# Patient Record
Sex: FEMALE | Race: WHITE | NOT HISPANIC OR LATINO | Employment: OTHER | URBAN - METROPOLITAN AREA
[De-identification: names, ages, dates, MRNs, and addresses within clinical notes are randomized per-mention and may not be internally consistent; named-entity substitution may affect disease eponyms.]

---

## 2023-08-11 ENCOUNTER — HOSPITAL ENCOUNTER (OUTPATIENT)
Dept: DATA CONVERSION | Facility: HOSPITAL | Age: 68
End: 2023-08-11

## 2023-09-27 ENCOUNTER — HOSPITAL ENCOUNTER (OUTPATIENT)
Dept: DATA CONVERSION | Facility: HOSPITAL | Age: 68
End: 2023-09-27
Payer: COMMERCIAL

## 2023-10-04 ENCOUNTER — CLINICAL SUPPORT (OUTPATIENT)
Dept: SPORTS MEDICINE | Facility: CLINIC | Age: 68
End: 2023-10-04
Payer: COMMERCIAL

## 2023-10-04 VITALS — BODY MASS INDEX: 31 KG/M2 | HEIGHT: 64 IN | SYSTOLIC BLOOD PRESSURE: 126 MMHG | DIASTOLIC BLOOD PRESSURE: 76 MMHG

## 2023-10-04 DIAGNOSIS — S83.412D SPRAIN OF MEDIAL COLLATERAL LIGAMENT OF LEFT KNEE, SUBSEQUENT ENCOUNTER: ICD-10-CM

## 2023-10-04 DIAGNOSIS — M25.862 PATELLOFEMORAL DYSFUNCTION OF LEFT KNEE: ICD-10-CM

## 2023-10-04 DIAGNOSIS — M21.70 LEG LENGTH DISCREPANCY: ICD-10-CM

## 2023-10-04 DIAGNOSIS — M76.52 PATELLAR TENDONITIS OF LEFT KNEE: ICD-10-CM

## 2023-10-04 DIAGNOSIS — M25.562 ACUTE PAIN OF LEFT KNEE: ICD-10-CM

## 2023-10-04 DIAGNOSIS — M71.22 POPLITEAL CYST, LEFT: ICD-10-CM

## 2023-10-04 DIAGNOSIS — L03.116 CELLULITIS OF KNEE, LEFT: ICD-10-CM

## 2023-10-04 DIAGNOSIS — M21.079 EVERSION DEFORMITY OF FOOT, UNSPECIFIED LATERALITY: ICD-10-CM

## 2023-10-04 DIAGNOSIS — M17.12 PRIMARY OSTEOARTHRITIS OF LEFT KNEE: ICD-10-CM

## 2023-10-04 DIAGNOSIS — S83.8X2D INJURY OF MENISCUS OF LEFT KNEE, SUBSEQUENT ENCOUNTER: ICD-10-CM

## 2023-10-04 DIAGNOSIS — S83.282D TEAR OF LATERAL MENISCUS OF LEFT KNEE, CURRENT, UNSPECIFIED TEAR TYPE, SUBSEQUENT ENCOUNTER: ICD-10-CM

## 2023-10-04 DIAGNOSIS — S83.242D TEAR OF MEDIAL MENISCUS OF LEFT KNEE, CURRENT, UNSPECIFIED TEAR TYPE, SUBSEQUENT ENCOUNTER: ICD-10-CM

## 2023-10-04 ASSESSMENT — PAIN SCALES - GENERAL: PAINLEVEL_OUTOF10: 3

## 2023-10-04 ASSESSMENT — PAIN DESCRIPTION - DESCRIPTORS: DESCRIPTORS: ACHING;NAGGING

## 2023-10-04 ASSESSMENT — PAIN - FUNCTIONAL ASSESSMENT: PAIN_FUNCTIONAL_ASSESSMENT: 0-10

## 2023-10-04 NOTE — PROGRESS NOTES
"Verbal consent of the patient and/or verbal parental consent for patients under the age of 18 have been obtained to conduct a physical examination at this office visit.          Established patient  History Of Present Illness  Marietta Avilez is a 68 y.o. female who presents for Prolozone #4 injection into her LEFT knee. She states that she is making slow, gradual improvement in knee pain since starting Euflexxa and Prolozone injections into her LEFT Knee. She continues to attend physical therapy and feels that aquatic therapy has been very beneficial. She continues to perform her home exercise program as directed previously. Marietta is not taking anything for pain management and is only applying moist heat as needed.     Historical Clinical Intake  February 21, 2023-------KEVYN  Verbal consent of the patient and/or verbal parental consent for patients under the age of 18 have been obtained to conduct a physical examination at this office visit. Newly established patient. Marietta, a 67 year old, visited the clinical today for her LEFT knee. Marietta brought her  to her appointment today. Marietta states she was in [place] and was walking on an uneven sidewalk and tripped and fell onto her LEFT knee in a flexed position on February 6, 2023. She states this is the first time she has been seen for this injury, therefore she did not have previous x-rays. Marietta states she has no previous medical history of any knee injuries. Marietta rated her pain as a 0/10 today stating she experiences discomfort, not necessarily pain. She was tender to palpate on the superior pole of the patella where she had an abrasion from the fall. Marietta states initially her LEFT knee was bleeding. Marietta stated when she fell it was a 10/10 pain. Marietta denies any pops, snaps or cracks as well as any numbness/tingling or pins and needles. She did note sometime last week she felt a \"pinched nerve\" while walking. She denies trying any current treatments for " "pain relief. She experienced no pain with knee flexion/extension.  -----------------------------  May 10, 2023 Above note reviewed. Verbal consent of the patient and/or verbal parental consent for patients under the age of 18 have been obtained to conduct a physical examination at this office visit. Marietta returns today for follow up of her LEFT knee. She states that she has good and bad days. Rates current pain as a 3/10 describing it as a throbbing pain. She has been referred in by her physical therapist due to a popping that was felt Friday during her physical therapy appointment. Pt also notes that she has been experiencing \"crackling\" feeling as well in her knee. We discussed treatment options. Patient has had several weeks of physical therapy and was making progress but has had a sudden relapse with significant increase in pain. This is concerning for more significant disease the previously considered as such we will order an MRI for further evaluation of her knee with consideration of meniscus versus MCL injury. Patient verbalizes understanding and agreement with plan of care.  -----------------------------  June 7, 2023 Above notes reviewed. Verbal consent of the patient and/or verbal parental consent for patients under the age of 18 have been obtained to conduct a physical examination at this office visit. Marietta returns today for follow up of her MRI of her LEFT knee. Pt states that she feels the same since her previous visit. Rates current pain as a 2-3/10 noting that her pain is always there. Pt completed physical therapy as of Friday last week. We reviewed patient MRI results in detail. Patient verbalizes understanding of results. We discussed treatment options and will fit patient for OA unloading brace with medial pressure and will have patient follow-up with Dr. Faulkner to discuss therapeutic injections. Patient will continue with physical therapy or home exercise plan and can take over-the-counter " medications for pain. Patient verbalizes understanding and agreement with plan of care.  -----------------------------  July 20, 2023 Above notes reviewed. Verbal consent of the patient and/or verbal parental consent for patients under the age of 18 have been obtained to conduct a physical examination at this office visit. Marietta, a newly established patient, returns today for Prolozone #1 injection into the LEFT knee. She is c/o numbness in her LEFT leg from the anterior aspect of her knee radiating down to her foot. Denies any locking/catching or instability in her LEFT Knee. Pain is currently 3/10. She says she still having a lot of pain in her knee at times however additionally she feels that other times it is radiating down into her foot and she describes it as a numbness and tingling. She also says going up and down steps hurts more than normal. I told her most likely she is dealing with 2 different things some arthritic changes in her knee as well as her meniscus injuries as well as additionally something going on from her lumbar spine radiating down her leg and into her foot. She is going to schedule an appointment for us to workup her lumbar spine in the future. Also she says she has never heard from the bracing representative yet for her custom OA unloading knee brace which I told her we would look into for her. We also talked in detail about different supplements she can take to help with the arthritis however she says she cannot take pills at all however I talked to her into looking into liquid forms of glucosamine chondroitin with hyaluronic acid and if not I told her she can grind up the pills and put them in applesauce.  -----------------------------  July 26,2023 Above notes reviewed. Verbal consent of the patient and/or verbal parental consent for patients under the age of 18 have been obtained to conduct a physical examination at this office visit. Marietta, a newly established patient, returns today  for Euflexxa#1. Marietta is currently 3/10 pain to Left knee described as aching primarily to the medial anterior and posterior knee. She confirms she occasionally has tingling and numbness to the LEFT leg. She is unable to swallow pills so she doesn't take anything for pain, but she does apply ice and heat to the area. She is in the process of scheduling her physical therapy.  -----------------------------  August 3, 2023 All notes reviewed. Verbal consent of the patient and/or verbal parental consent for patients under the age of 18 have been obtained to conduct a physical examination at this office visit. Marietta is a recently established patient who presents for Prolozone #2 injection into her LEFT knee. she says that she was a little sore after her last injection but overall she is doing okay. She says she noticed a difference in her range of motion and she is definitely not as painful.  -----------------------------  August 31, 2023 All notes reviewed. Verbal consent of the patient and/or verbal parental consent for patients under the age of 18 have been obtained to conduct a physical examination at this office visit. Marietta is a recently established patient who is here today for her Euflexxa #2 injection into her LEFT knee. Marietta states that her LEFT knee is doing considerably better since she started the viscosupplementation injections and regenerative injections in conjunction with physical therapy. She is now walking without a limp and denies any swelling, locking, catching, instability in her LEFT knee. She is unable to swallow pills and therefore is only using moist heat as needed for pain management. She rates LEFT knee pain currently at 2/10.  -----------------------------  [place] 7, 2023 Above notes reviewed. Verbal consent of the patient and/or verbal parental consent for patients under the age of 18 have been obtained to conduct a physical examination at this office visit. Marietta is an established  patient who presents for Prolozone #3 injection into her LEFT knee. She attends Physical Therapy at [facility] with aquatics, which she notes is most helpful. She reports diffuse swelling throughout her left knee over this past week. She notes recent weather fluctuations and cooler temperatures have caused her pain to increase to a reported 3/10 today. She is not presently utilizing any pain management interventions.  -----------------------------  September 14, 2023 Above notes reviewed. Verbal consent of the patient and/or verbal parental consent for patients under the age of 18 have been obtained to conduct a physical examination at this office visit. Marietta, an established patient, who is here today for Euflexxa #3 injection into her LEFT knee. She states that she continues to have pain that comes and goes. She continues to c/o pain climbing steps. She denies any instability, locking or catching in her left knee. Pain is currently 3/10. She states she is not ready to do the injection today because she thought it was only Prolozone today and she would like to wait to do injection until next week so that she is mentally prepared for it. Additionally, Marietta presents with an area of skin irritation on the anterior aspect of her LEFT knee that appeared 1-2 days after her last Prolozone injection. The lesion appears to be a reaction to the cold spray. There is mild increased redness around the area, but no drainage, odor, or warmth.  -----------------------------  September 27, 2023 Above notes reviewed. Verbal consent of the patient and/or verbal parental consent for patients under the age of 18 have been obtained to conduct a physical examination at this office visit. Marietta is an established patient who is present today to recieve Euflexxa #3 injections into her LEFT knee. She states that she continues to have on/off pain, today she is experiencing more pain. Rates current pain as a 3/10. She continues with physical therapy  and has two sessions left. Pt notes that the aquatic therapy has been greatly helping her she notes. Overall she is very happy with the results of the injections not only with her range of motion but also her strength. She says definitely if she continues to get improvement she would like to do these injections again in the future instead of even thinking about any surgical intervention.    Past Medical History  She has a past medical history of History of back surgery.    Surgical History  She has no past surgical history on file.     Social History  She reports that she has never smoked. She has never used smokeless tobacco. She reports that she does not drink alcohol and does not use drugs.    Family History  No family history on file.     Allergies  Patient has no known allergies.    Review of Systems  Review of Systems:  CONSTITUTIONAL:   Negative for weight change, loss of appetite, fatigue, weakness, fever, chills, night sweats, headaches .           HEENT:   Negative for cold, cough, sore throat, sinus pain, swollen lymph nodes.           OPHTHALMOLOGY:   Negative for diminished vision, blurred vision, loss of vision, double vision.           ALLERGY:   Negative for runny nose, scratchy throat, sinus congestion, rash, facial pressure, nasal congestion, post-nasal drip.           CARDIOLOGY:   Negative for chest pain, palpitations, murmurs, irregular heart beat, shortness of breath, leg edema, dyspnea on exertion, fatigue, dizziness.           RESPIRATORY:   Negative for chest pain, shortness of breath, swelling of the legs, asthma/copd, chest congestion, pain with breathing .           GASTROENTEROLOGY:   Negative for nausea, vomitting, heartburn, constipation, diarrhea, blood in stool, change in bowel habits, black stool.           HEMATOLOGY/LYMPH:   Negative for fatigue, loss of appetitie, easy bruising, easy bleeding, anemia, abnormal bleeding, slow healing.           ENDOCRINOLOGY:   Negative for  polyuria, polydipsia, polyphagia, fatigue, weight loss, weight gain, cold intolerance, heat intolerance, diabetes.           MUSCULOSKELETAL:   Positive  for LEFT knee pain        DERMATOLOGY:   Negative for rash, bruising.           NEUROLOGY:   Negative for tingling, numbness, gait abnormality, paresthesias, weakness, sciatica.        General (KNEE):  Location:  Left .   Erythema: Negative:, :no erythema scaling and noted where injection done two weeks ago, much improved since last week.   Edema:  Negative.   Effusion: Negative.   Warmth: Negative.   Percussion Test: Negative.   Tuning Fork Test: Negative.   Ecchymosis/Bruising: Negative.   Abrasions: Negative;.   Palpation: Positive:, Tender to palpation over the patella, Anterior Portion, Medial Joint Line, MCL  Burgos's Cyst: Negative.   Orientation: Symmetrical.   Range of Motion: Positive:, Painful, Flexion : , Extension:  Improved tremendously overall with injections And physical therapy.   Muscle Strength:   Positive:,   +5/+5, Quadricep Extension  +5/+5, Hamstring Flexion     +5/+5 Hip Flexion  +5/+5 Hip Extension     +5/+5 Hip ABduction away from body  +5/+5 Hip ADduction towards body     +5/+5 Hip Internal Rotation  +5/+5 Hip External Rotation      +5/+5 IT-Band      +5/+5 Gastrocnemius  +5/+5 Soleus.     DTR/Neurological:   +2/+4 Patellar (L4)  +2/+4 Posterior Tibialis Reflex and Medial Hamstrings (L5)  +2/+4 Achilles (S1).     Sensation/Neurological Lumbar:    Negative, Sensation intact:  L1: Low back, hips, and groin  L2: Low back and front of inside of upper leg/thigh  L3: Low back and front of upper leg/thigh  L4: Low back, front of upper leg/thigh, front of lower leg/calf, front of medial area of knee, and inside of ankle  L5: Low back, front and lateral knee, front and outside of lower leg/calf, top and bottom of foot and first four toes especially big toe.     Sensation/Neurological Sacrum:   Negative, Sensation Intact, 2 -Point Discrimination  Test: Negative  S1: low back, back of upper leg/thigh, back and inside of lower leg, calf and little toe  S2: Buttocks, genitals, back of upper leg/thigh and calves  S3: Buttocks and genitals  S4: Buttocks  S5: Buttocks.   Pain with Squat: Positive   Pain with Sumo Squat: Positive:   Vascular:   +2/+4 Femoral, +2/+4 Dorsalis Pedis, +2/+4 Posterior Tibial  Capillary Refill less than 2 seconds.   Feet/Foot:  Valgus Foot Deformity (Pes Planus) Bilateral.     Knee - ACL:  Anterior Drawer Test: Negative.   Lachman Test: Negative.   Prone Lachman Test: Negative.   Lelli Test: Negative.   Pivot Shift Test: Negative.   Crossover Test: Negative.     KNEE - FAT PAD:  Squeeze Test Knee Bent going into extension: Positive    KNEE - IT BAND:  Julio Cesar Test: Negative.   Noble Test: Negative.     KNEE - MCL / LCL:  Valgus Stress Test:  Positive:, 20-30 degrees  Varus Stress Test:  90 degrees: Negative, 20-30 degrees: Negative.    Apley Distraction Test:  Negative.   Thessaly Test:  Positive    KNEE - MENISCUS:  Apley Compression Test:  Negative.   Duck Walk Test:  Positive    Stienmann 1 Test:  Positive    Stienmann 2 Test:  Negative.   Everardo Test:  Positive  Bragard's Test:  Negative.   Bounce Home Test:  Positive  Payr Test:  Negative.   Thessaly Test:  Positive  Kyaw's Test:  Negative.     KNEE - PATELLA:  Apprehension Test: Negative.   Glide Test: Positive:.   Facet Tenderness Test: Negative.   Medial Patellar Plica Test: Negative.   Grind Test: Negative.   Patella Tracking Test: Negative.   Medial/Lateral Subluxation Test: Negative.   Suppression Test: Negative.     Knee - PCL/POSTERIOR LATERAL CORNER:  Posterior Drawer Test: Negative.   Hammer 90/90 (Sag Sign) Test: Negative.   Dial Test: Negative.   Reverse Lachman Test: Negative.   Quad Activation Test: Negative.   ER / Recurvatum Test: Negative.   Reverse Pivot Shift Test: Negative.     KNEE - POPLITEUS:  Jonathan's Test: Negative.     KNEE - QUADRICEPS:  Dreyer Test:  "Negative.   VMO Test: Negative.    VLO Test: Negative.     Hip/Pelvis - Sacrum:  Leg Length Supine:  Negative.   Leg Length Supine to Seated (Derbolowsky Sign):  Negative.   Standing Flexion Test:  Negative.   Seated Flexion Test:  Negative.   Spring Test:  Negative.   Sacral Somatic Dysfunction:  Negative.   Sacroiliac (SI) Joint Fixation Test:  Negative.   Hip Flexor Tightness:  Negative.   Hamstring Tightness:  Negative.      Last Recorded Vitals  /76 (BP Location: Right arm, Patient Position: Sitting, BP Cuff Size: Adult)   Ht 1.626 m (5' 4\")   BMI 31.00 kg/m²      Imaging and Diagnostics Review:  PROCEDURE: KNEE LT WO - TMR 2204  REASON FOR EXAM: SPRAIN OF OTHER SPECIFIED PARTS OF LEFT KNEE, SUBS ENCNTR   RESULT: MRN: [id number]     Patient Name: SHAKILA ESQUEDA  STUDY:KNEE LT WO 5/31/2023 6:02 pm  INDICATION:SPRAIN OF OTHER SPECIFIED PARTS OF LEFT KNEE, SUBS ENCNTR pain fall anterior knee  COMPARISON:02/21/2023     ACCESSION NUMBER(S):[id number]   ORDERING CLINICIAN:YELENA MENENDEZ     STUDY:Multiplanar and multisequence MR images were obtained of the left knee.      FINDINGS:  Anterior cruciate ligament is intact.        Posterior cruciate ligament is intact.        Lateral meniscus demonstrates a free edge tear of the posterior horn of the lateral meniscus about the body junction (series 7, image 5 and series 9, image 17).         Medial meniscus demonstrates a blunted free edge of the posterior horn of the medial meniscus component which is likely on a degenerative basis.          Correlate with patient's history including partial meniscectomy. The body of the medial meniscus is unremarkable with mild meniscal degeneration.          No longitudinal tear       Medial collateral ligament complex is unremarkable         Lateral supporting structures are intact. There is no edema in the posterolateral corner         Extensor mechanism is intact. Mild patellar tendinosis demonstrated.         "   Patellofemoral articulation demonstrates mild thinning of the articular cartilage particularly within the lateral facet of the patella inferiorly. Trochlea demonstrates mild thinning of the cartilage along the outer margin medial trochlea. No cartilage defect      Lateral femorotibial compartment demonstrates mild thinning of the cartilage along the outer margin weight-bearing portion of the compartment. Medial femorotibial compartment demonstrates denuded articular cartilage within the weight-bearing portion of the compartment.           There is osteophytosis along the outer margin of the compartment. Minimal subchondral intermediate signal intensity. Also, component of severe cartilage thinning along the posterior nonweightbearing femoral condyle           There is no knee joint effusion. No intra-articular bodies demonstrated.            Subtle popliteal cyst demonstrated.       IMPRESSION:  1. Medial femorotibial compartment osteoarthritis with severe loss of articular cartilage within the weight-bearing portion of the compartment.  2. Free edge tear of the posterior horn lateral meniscus about the body junction.  3. Blunted free edge free edge irregularity of the posterior horn of the medial meniscus on a degenerative basis. Mild peripheral extrusion of the body medial meniscus.            Dictation workstation: [id number]   Original Interpreting Physician: ANGELA NUGENT MD  Original Transcribed by/Date: MMODAL May 31 2023 4:55P  -----------------------------------------------------  PROCEDURE: KNEE LT MIN 4 VIEW - TXR 0182  REASON FOR EXAM: ACUTE PAIN OF LEFT KNEE  RESULT: MRN: [id number]     Patient Name: SHAKILA ESQUEDA  STUDY:KNEE LT MIN 4 VIEW; 2/21/2023 8:50 am  INDICATION:ACUTE PAIN OF LEFT KNEE. Status post fall 2 weeks ago. Anterior pain and swelling.  COMPARISON:None.    ACCESSION NUMBER(S):[id number]  ORDERING CLINICIAN:YELENA MENENDEZ    TECHNIQUE:Left knee four views    FINDINGS:  No fractures  or destructive lesions are identified. There is medial tibiofemoral marginal spurring. No effusion is identified.    IMPRESSION:  Medial tibiofemoral osteoarthritis.    Dictation workstation: [id number]  Original Interpreting Physician: BINDU TORRES M.D.  Original Transcribed by/Date: MMODAL Feb 21 2023 8:35A    Assessment   1. Acute pain of left knee        2. Injury of meniscus of left knee, subsequent encounter        3. Tear of lateral meniscus of left knee, current, unspecified tear type, subsequent encounter        4. Tear of medial meniscus of left knee, current, unspecified tear type, subsequent encounter        5. Patellar tendonitis of left knee        6. Sprain of medial collateral ligament of left knee, subsequent encounter        7. Primary osteoarthritis of left knee        8. Popliteal cyst, left        9. Patellofemoral dysfunction of left knee        10. Eversion deformity of foot, unspecified laterality        11. Leg length discrepancy        12. Cellulitis of knee, left            Procedure   Time Out (5 Minutes): Yes  Patient Name: Marietta Avilez  YOB: 1955  Procedure: Prolozone injection #4  Needed Supplies Available: Correct Supplies  Laterality: Left knee  Site Verified and Marked: Correct Site(s) Marked  Timeout Performed by Provider: Dr. Richard Faulkner D.O.  Staff Initials: SLN    Patient is informed and consent has been signed by the patient if over 18 years old., Patient parent and/or legal guardian is informed and consent has been signed., Patient and/or parent and/or legal guardian accept all risks, benefits, and possible complications associated with procedure(s) and/or manipulation(s) and/or injection(s)., Patient and/or parent and/or legal guardian deny patient allergy or known complications with any of the medications, instruments, products, and/or techniques used., All questions and concerns were answered and/or addressed with the patient and/or parent and/or  legal guardian., The patient and/or parent and/or legal guardian agree to proceed with the procedure(s) and/or manipulation(s) and/or injection(s)., Prep: The area was cleansed with a mixture of equal parts rubbing alcohol 70% and Hibclens., Utilizing clean technique, the injection site(s) were marked with a skin marker., and Injection site(s) were anesthestized with topical analgesic spray prior to injection.    Performed as a separate, cash-based service regenerative Prolozone injection # 4 into the patients Left knee, under ultrasound.    Prolozone mixture of:, 2mL Lidocaine 2% (NDC: 3191-0390-34 LOT#: [id number] EXP: 01/01/2025), 0.1mL Sodium Bicarbonate 8.4% (NDC: 8182-7112-10  LOT#: [id number] EXP: 01/01/2024), 1mL Vitamin B12 (NDC: 74664-206-50; LOT#: [id number] EXP: 08/31/2024) , 0.1mL Folic Acid (NDC: 95144-966-95; LOT#: [id number] EXP: 06/30/2024), 0.1mL Vitamin B Complex (NDC: 35969-617-61; LOT#: [id number] EXP: 12/01/2024), 1mL 50% Dextrose (NDC: [address]-4; LOT#: [id number] EXP: 12/01/2024), 2mL Biocean Marine Plasma (NO NDC) , and  followed by injection of Ozone 20mg/ml without removing the needle    Band-aid and/or compressive bandage was placed over the injection site(s). After the injection(s) performed osteopathic manipulation therapy to the affected area(s) to help increase blood flow to aid with the healing process as well as circulation of the medication. The patient tolerated the procedure well without any complications. The patient was instructed to gently massage the treatment site(s) as well as to apply moist heat to the affected area(s). Additionally, the patient was instructed to contact the office immediately with any complications or concerns.    The Scribe, Lalita, and , Mely, were present in the room during the entire procedure.    The following discharge instructions were reviewed in detail with the patient to the level of their understanding:    PAIN:  A mild to moderate amount of  discomfort, tenderness, stiffness, and achiness-caused by the inflammation of the area can be expected for a few days after the injection. This is normal and good as the inflammation is an important part of the healing process.    SKIN: Due to the numbing spray used, you may develop a red, itchy, scaly rash in the area that was sprayed. Should your skin become itchy, wash the area with mild soap and warm water. If needed, you may apply topical over-the-counter Hydrocortisone cream to alleviate any itchiness. AVOID scratching due to risk of infection.,     BATHING/SWIMMING: You may shower after your procedure with regular soap and water, but no baths, no swimming, and no hot tubs for 3-4 days after the procedure.,     ACTIVITIES:  Immediately following the injection, you may resume daily activities (such as work) and light exercise. We suggest that you refrain from strenuous activity and heavy lifting, particularly the injured body part, for a week post-procedure, but sometimes this can change as well.    MOIST HEAT/ICE:  Moist heat may be used on the injection area. NO ICE.  MEDICATION: You may continue your prescribed medications and any over-the-counter supplements; however, it is not recommended to use aspirin or anti-inflammatories (Motrin, Advil, Aleve, Ibuprofen, Naproxen etc.) for up to 2 weeks post procedure. Tylenol Extra Strength, Tylenol Arthritis and/or any prescribed narcotics or muscle relaxants are OK to take.    APPOINTMENTS: You should have a follow-up appointment with Dr. Faulkner scheduled 1-3 weeks as recommended after your procedure for your next round of injections or to follow-up after you have completed your injection series. Please schedule your follow-up appointment on your way out after your procedure, or call the office to schedule these appointments as soon as possible.    PRECAUTIONS: Smoking, caffeine, alcohol, sex and anti-inflammatories post-procedure may reduce the effectiveness of  Prolotherapy/Neural Prolotherapy/Prolozone injections. Early, rare post procedure problems that can be concerning are as follows: an increase in pain not relieved by medication (over the counter or prescription), a temperature above 101 degrees, bleeding or progressive, extreme swelling, or numbness.     CALL THE OFFICE OR GO TO THE EMERGENCY ROOM IF ANY OF THESE SYMPTOMS OCCUR.   If you have any questions or problems, please call our office at [phone]    Treatment or Intervention:  1. May continue moist heat as needed only while doing regenerative injections.  2. Continue to perform home exercise program as directed previously by her physical therapist. Plan to write updated order in a few weeks to return to PT including aquatic therapy  3. Again stressed the importance of wearing shoes with good stability control to help with the biomechanics affecting the knees and lower extremities  4. Again stressed the importance of wearing full foot insoles to help with the biomechanics affecting the knees and lower extremities  5. Again recommendation over-the-counter vitamin-D 2 -3000+ milligrams a day, as well as a daily multivitamin. Advised patient to try and find liquid form for supplements or crush them up and put them in applesauce.  6. Again recommendation over-the-counter Move Free for joint health.  7. May take the following: OTC Tylenol Extra Strength or OTC Tylenol Arthritis, taking one every 6-8 hours with food as needed. Again, may crush up pills and put them in applesauce.  8. Patient advised regarding the risks and/or potential adverse reactions and/or side effects of any prescribed medications along with any over-the-counter medications or any supplements used. Patient advised to seek immediate medical care if any adverse reactions occur. The patient and/or patient(s) parent(s) verbalized their understanding  9. continue to wear OA unloading knee brace.  10. Performed as a separate cash-based service  Prolozone #4 injection into the patients BILATERAL knees under ultrasound. The following medications were injected intra-articularly by Dr. LNARE Faulkner D.O. into both of the patients knees:  11. Follow-up in 3 months for a reevaluation of the patients LEFT knee or sooner as needed.       Diagnostic studies:  No additional imaging or laboratory studies are needed at this time.    Activity Instructions, Restrictions, and Accommodations:  No activity limitations or modifications are needed at this time.    Consultations/Referrals:  None at this time.    Follow-up:  Follow-up in 3 months for a reevaluation of the patients LEFT knee or sooner as needed.       RIKA WHITFIELD on 10/10/23 at 1:16 PM.     Richard Faulkner DO, FAOASM

## 2023-10-06 NOTE — PATIENT INSTRUCTIONS
The following discharge instructions were reviewed in detail with the patient to the level of their understanding:    PAIN:  A mild to moderate amount of discomfort, tenderness, stiffness, and achiness-caused by the inflammation of the area can be expected for a few days after the injection. This is normal and good as the inflammation is an important part of the healing process.    SKIN: Due to the numbing spray used, you may develop a red, itchy, scaly rash in the area that was sprayed. Should your skin become itchy, wash the area with mild soap and warm water. If needed, you may apply topical over-the-counter Hydrocortisone cream to alleviate any itchiness. AVOID scratching due to risk of infection.,     BATHING/SWIMMING: You may shower after your procedure with regular soap and water, but no baths, no swimming, and no hot tubs for 3-4 days after the procedure.,     ACTIVITIES:  Immediately following the injection, you may resume daily activities (such as work) and light exercise. We suggest that you refrain from strenuous activity and heavy lifting, particularly the injured body part, for a week post-procedure, but sometimes this can change as well.    MOIST HEAT/ICE:  Moist heat may be used on the injection area. NO ICE.  MEDICATION: You may continue your prescribed medications and any over-the-counter supplements; however, it is not recommended to use aspirin or anti-inflammatories (Motrin, Advil, Aleve, Ibuprofen, Naproxen etc.) for up to 2 weeks post procedure. Tylenol Extra Strength, Tylenol Arthritis and/or any prescribed narcotics or muscle relaxants are OK to take.    APPOINTMENTS: You should have a follow-up appointment with Dr. Faulkner scheduled 1-3 weeks as recommended after your procedure for your next round of injections or to follow-up after you have completed your injection series. Please schedule your follow-up appointment on your way out after your procedure, or call the office to schedule these  appointments as soon as possible.    PRECAUTIONS: Smoking, caffeine, alcohol, sex and anti-inflammatories post-procedure may reduce the effectiveness of Prolotherapy/Neural Prolotherapy/Prolozone injections. Early, rare post procedure problems that can be concerning are as follows: an increase in pain not relieved by medication (over the counter or prescription), a temperature above 101 degrees, bleeding or progressive, extreme swelling, or numbness.     CALL THE OFFICE OR GO TO THE EMERGENCY ROOM IF ANY OF THESE SYMPTOMS OCCUR.   If you have any questions or problems, please call our office at [phone]    Treatment or Intervention:  1. May continue moist heat as needed only while doing regenerative injections.  2. Continue to perform home exercise program as directed previously by her physical therapist. Plan to write updated order in a few weeks to return to PT including aquatic therapy  3. Again stressed the importance of wearing shoes with good stability control to help with the biomechanics affecting the knees and lower extremities  4. Again stressed the importance of wearing full foot insoles to help with the biomechanics affecting the knees and lower extremities  5. Again recommendation over-the-counter vitamin-D 2 -3000+ milligrams a day, as well as a daily multivitamin. Advised patient to try and find liquid form for supplements or crush them up and put them in applesauce.  6. Again recommendation over-the-counter Move Free for joint health.  7. May take the following: OTC Tylenol Extra Strength or OTC Tylenol Arthritis, taking one every 6-8 hours with food as needed. Again, may crush up pills and put them in applesauce.  8. Patient advised regarding the risks and/or potential adverse reactions and/or side effects of any prescribed medications along with any over-the-counter medications or any supplements used. Patient advised to seek immediate medical care if any adverse reactions occur. The patient  and/or patient(s) parent(s) verbalized their understanding  9. continue to wear OA unloading knee brace.    Follow-up in 3 months for a reevaluation of the patients LEFT knee or sooner as needed.

## 2023-10-19 PROBLEM — S83.412A SPRAIN OF MEDIAL COLLATERAL LIGAMENT OF LEFT KNEE: Status: ACTIVE | Noted: 2023-10-19

## 2023-10-19 PROBLEM — S89.92XA INJURY OF LEFT KNEE: Status: ACTIVE | Noted: 2023-10-19

## 2023-10-19 PROBLEM — S83.282A TEAR OF LATERAL MENISCUS OF LEFT KNEE, CURRENT: Status: ACTIVE | Noted: 2023-10-19

## 2023-10-19 PROBLEM — M71.22 POPLITEAL CYST, LEFT: Status: ACTIVE | Noted: 2023-10-19

## 2023-10-19 PROBLEM — S80.00XA CONTUSION OF KNEE: Status: ACTIVE | Noted: 2023-10-19

## 2023-10-19 PROBLEM — S83.242A TEAR OF MEDIAL MENISCUS OF LEFT KNEE, CURRENT: Status: ACTIVE | Noted: 2023-10-19

## 2023-10-19 PROBLEM — M76.52 PATELLAR TENDONITIS OF LEFT KNEE: Status: ACTIVE | Noted: 2023-10-19

## 2023-10-19 PROBLEM — M17.12 PRIMARY OSTEOARTHRITIS OF LEFT KNEE: Status: ACTIVE | Noted: 2023-10-19

## 2023-10-19 PROBLEM — S76.312A LEFT HAMSTRING MUSCLE STRAIN: Status: ACTIVE | Noted: 2023-10-19

## 2023-10-19 PROBLEM — M21.70 LEG LENGTH DISCREPANCY: Status: ACTIVE | Noted: 2023-10-19

## 2023-10-19 PROBLEM — M25.562 ACUTE PAIN OF LEFT KNEE: Status: ACTIVE | Noted: 2023-10-19

## 2023-10-20 ENCOUNTER — TREATMENT (OUTPATIENT)
Dept: PHYSICAL THERAPY | Facility: CLINIC | Age: 68
End: 2023-10-20
Payer: COMMERCIAL

## 2023-10-20 DIAGNOSIS — S83.412D SPRAIN OF MEDIAL COLLATERAL LIGAMENT OF LEFT KNEE, SUBSEQUENT ENCOUNTER: ICD-10-CM

## 2023-10-20 DIAGNOSIS — S83.412A SPRAIN OF MEDIAL COLLATERAL LIGAMENT OF LEFT KNEE, INITIAL ENCOUNTER: ICD-10-CM

## 2023-10-20 DIAGNOSIS — M76.52 PATELLAR TENDINITIS, LEFT KNEE: ICD-10-CM

## 2023-10-20 DIAGNOSIS — M25.562 PAIN IN LEFT KNEE: ICD-10-CM

## 2023-10-20 PROCEDURE — 97113 AQUATIC THERAPY/EXERCISES: CPT | Mod: GP

## 2023-10-20 ASSESSMENT — PAIN - FUNCTIONAL ASSESSMENT: PAIN_FUNCTIONAL_ASSESSMENT: 0-10

## 2023-10-20 ASSESSMENT — PAIN SCALES - GENERAL: PAINLEVEL_OUTOF10: 3

## 2023-10-20 NOTE — PROGRESS NOTES
Physical Therapy Treatment    Patient Name: Marietta Avilez  MRN: [id number]  Encounter date:  10/20/2023  Time Calculation  Start Time: 1315  Stop Time: 1400  Time Calculation (min): 45 min    Visit Number:  9/10   Visit Authorized:  40/yr (18 used prior to this series)    Current Problem  1. Sprain of medial collateral ligament of left knee, initial encounter  PT eval and treat      2. Sprain of medial collateral ligament of left knee, subsequent encounter  PT eval and treat      3. Patellar tendinitis, left knee  PT eval and treat      4. Pain in left knee  PT eval and treat          Precautions  Precautions  Precautions Comment: none    Pain  Pain Assessment: 0-10  Pain Score: 3  Pain Location: Knee  Pain Orientation: Right    Subjective  General  Response to Previous Treatment: Patient with no complaints from previous session., Compliant with home exercise program  Pt not to PT in 3 weeks due to scheduling and other conflicts. She was doing pretty well until two days ago, she was sitting for a long time and had increased pain in her knee and her hip. Her hip is better today, but knee pain remains a little higher than previous visits.    Objective  Pain with L knee flexion in WB and NWB      Treatment:    Aquatic Therapy:   Aquatic Exercise Performed: Yes  Walk x3 laps fwd, bwd, s/s across pool    4' depth, cues for TrA brace, x15 ea with railing support  Heel raise  Hip abduction  Hip extension  Hip flexion/SLR  Hamstring curl    March in place x10 without rail support    Deep, 5' with noodle under arms, BUE support:  Decompression x2'  Hip abd/adduction x2'  XC ski x2' - deferred due to pain  Bike x2'  Decompression x2'    Assessment:  PT Assessment  Evaluation/Treatment Tolerance: Patient limited by pain  Pt's response to treatment:  More support used in shallow water due to higher pain levels. Pt still had some pain in pool with L knee flexion however better than on land. Pt encouraged to continue with HEP and  use ice/heat as needed to manage symptoms over the weekend.   Areas of improvements:  Some reduction in pain in pool  Limitations/deficits:  limited tolerance to knee flexion exercises    Pain end of session:  2/10    Plan:  Reassess next visit    Assessment of current progress against goals:  Progressing toward functional goals      Goals:  Active       PT Problem       Goals (Progressing)       Start:  09/28/23    Expected End:  12/27/23       Pt will demonstrate 0-125 deg L knee ROM without pain for improved body mechanics during functional mobility.     Pt will increase strength in LLE by 1/2 MMT in all planes for improved performance of functional mobility.    Pt will ambulate long community distances across all surfaces including grass, inclines, and declines without pain or deviation.    Pt will ascend/descend 2 flights of stairs reciprocally without pain for improved performance of household and community mobility.    Pt will stand for up to one hour without pain and without feelings of imbalance.    Pt will complete 5x sit to  12s without pain for improved functional strength and mobility.

## 2023-10-27 ENCOUNTER — TREATMENT (OUTPATIENT)
Dept: PHYSICAL THERAPY | Facility: CLINIC | Age: 68
End: 2023-10-27
Payer: COMMERCIAL

## 2023-10-27 DIAGNOSIS — M25.562 PAIN IN LEFT KNEE: ICD-10-CM

## 2023-10-27 DIAGNOSIS — M76.52 PATELLAR TENDINITIS, LEFT KNEE: ICD-10-CM

## 2023-10-27 DIAGNOSIS — S83.412A SPRAIN OF MEDIAL COLLATERAL LIGAMENT OF LEFT KNEE, INITIAL ENCOUNTER: ICD-10-CM

## 2023-10-27 DIAGNOSIS — S83.412D SPRAIN OF MEDIAL COLLATERAL LIGAMENT OF LEFT KNEE, SUBSEQUENT ENCOUNTER: ICD-10-CM

## 2023-10-27 PROCEDURE — 97113 AQUATIC THERAPY/EXERCISES: CPT | Mod: GP

## 2023-10-27 ASSESSMENT — PAIN - FUNCTIONAL ASSESSMENT: PAIN_FUNCTIONAL_ASSESSMENT: 0-10

## 2023-10-27 ASSESSMENT — PAIN SCALES - GENERAL: PAINLEVEL_OUTOF10: 3

## 2023-10-27 NOTE — PROGRESS NOTES
Physical Therapy Treatment    Patient Name: Marietta Avilez  MRN: [id number]  Encounter date:  10/27/2023  Time Calculation  Start Time: 1200  Stop Time: 1241  Time Calculation (min): 41 min    Date of evaluation: 8/11/23  Visit #: 10/10  Visits authorized: 40/yr (18 used prior to this series)    Current Problem  1. Sprain of medial collateral ligament of left knee, initial encounter  PT eval and treat    Follow Up In Physical Therapy      2. Sprain of medial collateral ligament of left knee, subsequent encounter  PT eval and treat    Follow Up In Physical Therapy      3. Patellar tendinitis, left knee  PT eval and treat    Follow Up In Physical Therapy      4. Pain in left knee  PT eval and treat    Follow Up In Physical Therapy            Precautions  Precautions  Precautions Comment: none    Pain  Pain Assessment: 0-10  Pain Score: 3  Pain Location: Knee  Pain Orientation: Left    Subjective  General  Response to Previous Treatment: Patient with no complaints from previous session., Compliant with home exercise program  Pt reports feeling less pain overall since starting in the pool, and symptoms at their worst are less intense than they had been before. The last week has been more painful without specific reason which makes her ability to  her progress more challenging. She thinks she is stronger overall but due to this week's pain some weakness is present. Walking tolerance has improved, but stairs are challenging. She would like to continue PT after this date.     Objective  LE MMT L   Hip flexion 4+/5   Hip extension 4/5   Hip abduction 4/5   Hip adduction 4/5   Knee flexion 4/5   Knee extension 4+/5     Knee AROM L   Flexion 115 deg   Extension 0 deg     Gait: Antalgia, slow nba on land with decreased LLE stance time (improved since eval)  5x sit to stand: 24.5s, painful (bench lower than at eval - higher pain today likely limits effectiveness of testing)  Stairs: performs reciprocally but pain when  ascending with LLE    Treatment:    Aquatic Therapy:   Aquatic Exercise Performed: Yes  Walk x3 laps fwd, bwd, s/s across pool    4' depth, cues for TrA brace, x15 ea with railing support  Heel raise  Hip abduction  Hip extension  Hip flexion/SLR  Hamstring curl    March in place x10 without rail support - deferred due to pain    Deep, 5' with noodle under arms, BUE support:  Decompression x2'  Hip abd/adduction x2'  XC ski x2'   Bike x2'  Decompression x2'    Assessment:  PT Assessment  PT Assessment Results: Decreased strength, Decreased range of motion, Impaired balance, Decreased mobility, Pain  POC treatments have consisted of: therapeutic exercise and aquatics    Testing somewhat limited today due to higher pain, pt would likely show better strength and functional mobility prior to recent flare up.  Pt response to treatment: Pt with overall better strength, pain levels, and mobility since starting aquatics. She would benefit from continued intervention to address remaining impairments and improve function.  Remaining deficits: decreased ROM, weakness, pain with distance walking and stairs     Pain end of session:  2/10    Plan:  OP PT Plan  Treatment/Interventions: Aquatic therapy  PT Plan: Skilled PT  PT Frequency: 2 times per week  Duration: 10 additional visits (20 total)  Certification Period Start Date: 10/27/23  Certification Period End Date: 01/25/24  Number of Treatments Authorized: 40/yr  Rehab Potential: Good  Plan of Care Agreement: Patient      Goals:  Pt progressing towards goals, testing today somewhat limited by pain flare up. Goals remain appropriate.   Active       PT Problem       Goals (Progressing)       Start:  09/28/23    Expected End:  01/25/24       Pt will demonstrate 0-125 deg L knee ROM without pain for improved body mechanics during functional mobility.     Pt will increase strength in LLE by 1/2 MMT in all planes for improved performance of functional mobility.    Pt will ambulate  long community distances across all surfaces including grass, inclines, and declines without pain or deviation.    Pt will ascend/descend 2 flights of stairs reciprocally without pain for improved performance of household and community mobility.    Pt will stand for up to one hour without pain and without feelings of imbalance.    Pt will complete 5x sit to  12s without pain for improved functional strength and mobility.

## 2023-10-30 ENCOUNTER — OFFICE VISIT (OUTPATIENT)
Dept: PRIMARY CARE | Facility: CLINIC | Age: 68
End: 2023-10-30
Payer: COMMERCIAL

## 2023-10-30 VITALS
HEIGHT: 64 IN | BODY MASS INDEX: 30.49 KG/M2 | OXYGEN SATURATION: 99 % | DIASTOLIC BLOOD PRESSURE: 70 MMHG | WEIGHT: 178.6 LBS | SYSTOLIC BLOOD PRESSURE: 132 MMHG | HEART RATE: 68 BPM

## 2023-10-30 DIAGNOSIS — M54.16 LUMBAR RADICULOPATHY: Primary | ICD-10-CM

## 2023-10-30 PROCEDURE — 1036F TOBACCO NON-USER: CPT | Performed by: PHYSICIAN ASSISTANT

## 2023-10-30 PROCEDURE — 1125F AMNT PAIN NOTED PAIN PRSNT: CPT | Performed by: PHYSICIAN ASSISTANT

## 2023-10-30 PROCEDURE — 99213 OFFICE O/P EST LOW 20 MIN: CPT | Performed by: PHYSICIAN ASSISTANT

## 2023-10-30 PROCEDURE — 1159F MED LIST DOCD IN RCRD: CPT | Performed by: PHYSICIAN ASSISTANT

## 2023-10-30 ASSESSMENT — PATIENT HEALTH QUESTIONNAIRE - PHQ9
SUM OF ALL RESPONSES TO PHQ9 QUESTIONS 1 AND 2: 0
1. LITTLE INTEREST OR PLEASURE IN DOING THINGS: NOT AT ALL
2. FEELING DOWN, DEPRESSED OR HOPELESS: NOT AT ALL

## 2023-10-30 ASSESSMENT — PAIN SCALES - GENERAL: PAINLEVEL: 4

## 2023-10-30 NOTE — PROGRESS NOTES
"Subjective   Patient ID: Marietta Avilez is a 68 y.o. female who presents for Follow-up (Patient states she has been having b/l leg numbness for the past few months.).    Presents for follow up - states that she has had progressive numbness and weakness in legs for several months. States that she has worsening sensation in R leg. Denies recent injury but remote hx of back surgery.   Currently completing water therapy as well for ongoing knee/leg pain.         Review of Systems   All other systems reviewed and are negative.      Objective   /70   Pulse 68   Ht 1.626 m (5' 4\")   Wt 81 kg (178 lb 9.6 oz)   SpO2 99%   BMI 30.66 kg/m²     Physical Exam  Constitutional:       General: She is not in acute distress.  Cardiovascular:      Rate and Rhythm: Normal rate and regular rhythm.   Pulmonary:      Breath sounds: Normal breath sounds.   Musculoskeletal:      Comments: R SI tenderness; reflexes equal;    Neurological:      Mental Status: She is alert.         Assessment/Plan   Diagnoses and all orders for this visit:  Lumbar radiculopathy  -     MR lumbar spine wo IV contrast; Future         "

## 2023-11-14 ENCOUNTER — TREATMENT (OUTPATIENT)
Dept: PHYSICAL THERAPY | Facility: CLINIC | Age: 68
End: 2023-11-14
Payer: COMMERCIAL

## 2023-11-14 DIAGNOSIS — M25.562 PAIN IN LEFT KNEE: ICD-10-CM

## 2023-11-14 DIAGNOSIS — S83.412A SPRAIN OF MEDIAL COLLATERAL LIGAMENT OF LEFT KNEE, INITIAL ENCOUNTER: ICD-10-CM

## 2023-11-14 DIAGNOSIS — M76.52 PATELLAR TENDINITIS, LEFT KNEE: ICD-10-CM

## 2023-11-14 DIAGNOSIS — S83.412D SPRAIN OF MEDIAL COLLATERAL LIGAMENT OF LEFT KNEE, SUBSEQUENT ENCOUNTER: ICD-10-CM

## 2023-11-14 PROCEDURE — 97113 AQUATIC THERAPY/EXERCISES: CPT | Mod: GP

## 2023-11-14 ASSESSMENT — PAIN - FUNCTIONAL ASSESSMENT: PAIN_FUNCTIONAL_ASSESSMENT: 0-10

## 2023-11-14 ASSESSMENT — PAIN SCALES - GENERAL: PAINLEVEL_OUTOF10: 2

## 2023-11-14 NOTE — PROGRESS NOTES
Physical Therapy Treatment    Patient Name: Marietta Avilez  MRN: [id number]  Encounter date:  11/14/2023  Time Calculation  Start Time: 1031  Stop Time: 1115  Time Calculation (min): 44 min    Visit Number:  11/20   Visit Authorized:  40/yr (18 used prior to this series)    Current Problem  1. Sprain of medial collateral ligament of left knee, initial encounter  Follow Up In Physical Therapy      2. Sprain of medial collateral ligament of left knee, subsequent encounter  Follow Up In Physical Therapy      3. Patellar tendinitis, left knee  Follow Up In Physical Therapy      4. Pain in left knee  Follow Up In Physical Therapy            Precautions  Precautions  Precautions Comment: none    Pain  Pain Assessment: 0-10  Pain Score: 2  Pain Location: Knee  Pain Orientation: Left  Pain was up to 4/10 yesterday    Subjective  General  Response to Previous Treatment: Patient with no complaints from previous session., Compliant with home exercise program  Pt continues to have intermittent flare ups without cause. Yesterday pain was high without incident or increase in activity. She is frustrated by increase in symptoms whenever she starts to feel better.     Objective  Gait WNL in pool, mild antalgia on deck    Treatment:  Aquatic Therapy:   Aquatic Exercise Performed: Yes  Walk x3 laps fwd, bwd, s/s across pool    4' depth, cues for TrA brace, x15 ea with railing support  Heel raise  Hip abduction  Hip extension  Hip flexion/SLR  Hamstring curl    March in place x10 without rail support    Deep, 5' with noodle under arms, BUE support:  Decompression x2'  Hip abd/adduction x2'  XC ski x2' - deferred due to pain  Bike x2'  Decompression x2'    Assessment:  PT Assessment  PT Assessment Results: Decreased strength, Decreased range of motion, Impaired balance, Decreased mobility, Pain  Pt's response to treatment:  Pt encouraged to schedule follow up with Dr. Faulkner due to increase in symptoms recently. Despite recent pain pt had  better exercise tolerance then previous visits. Pt with good reduction in pain from treatment.   Areas of improvements:  good pain relief in pool  Limitations/deficits:  continued flare ups outside of therapy    Pain end of session:  0/10    Plan:  Continue with current POC/no changes    Assessment of current progress against goals:  Progressing toward functional goals      Goals:  Active       PT Problem       Goals (Progressing)       Start:  09/28/23    Expected End:  01/25/24       Pt will demonstrate 0-125 deg L knee ROM without pain for improved body mechanics during functional mobility.     Pt will increase strength in LLE by 1/2 MMT in all planes for improved performance of functional mobility.    Pt will ambulate long community distances across all surfaces including grass, inclines, and declines without pain or deviation.    Pt will ascend/descend 2 flights of stairs reciprocally without pain for improved performance of household and community mobility.    Pt will stand for up to one hour without pain and without feelings of imbalance.    Pt will complete 5x sit to  12s without pain for improved functional strength and mobility.

## 2023-11-16 ENCOUNTER — APPOINTMENT (OUTPATIENT)
Dept: RADIOLOGY | Facility: CLINIC | Age: 68
End: 2023-11-16
Payer: COMMERCIAL

## 2023-11-17 ENCOUNTER — TREATMENT (OUTPATIENT)
Dept: PHYSICAL THERAPY | Facility: CLINIC | Age: 68
End: 2023-11-17
Payer: COMMERCIAL

## 2023-11-17 DIAGNOSIS — M25.562 PAIN IN LEFT KNEE: ICD-10-CM

## 2023-11-17 DIAGNOSIS — S83.412A SPRAIN OF MEDIAL COLLATERAL LIGAMENT OF LEFT KNEE, INITIAL ENCOUNTER: ICD-10-CM

## 2023-11-17 DIAGNOSIS — M76.52 PATELLAR TENDINITIS, LEFT KNEE: ICD-10-CM

## 2023-11-17 DIAGNOSIS — S83.412D SPRAIN OF MEDIAL COLLATERAL LIGAMENT OF LEFT KNEE, SUBSEQUENT ENCOUNTER: ICD-10-CM

## 2023-11-17 PROCEDURE — 97113 AQUATIC THERAPY/EXERCISES: CPT | Mod: GP

## 2023-11-17 ASSESSMENT — PAIN SCALES - GENERAL: PAINLEVEL_OUTOF10: 2

## 2023-11-17 ASSESSMENT — PAIN - FUNCTIONAL ASSESSMENT: PAIN_FUNCTIONAL_ASSESSMENT: 0-10

## 2023-11-17 NOTE — PROGRESS NOTES
Physical Therapy Treatment    Patient Name: Marietta Avilez  MRN: [id number]  Encounter date:  11/17/2023  Time Calculation  Start Time: 1300  Stop Time: 1345  Time Calculation (min): 45 min    Visit Number:  12/20   Visit Authorized:  40/yr (18 used prior to this series)    Current Problem  1. Sprain of medial collateral ligament of left knee, initial encounter  Follow Up In Physical Therapy      2. Sprain of medial collateral ligament of left knee, subsequent encounter  Follow Up In Physical Therapy      3. Patellar tendinitis, left knee  Follow Up In Physical Therapy      4. Pain in left knee  Follow Up In Physical Therapy              Precautions  Precautions  Precautions Comment: none    Pain  Pain Assessment: 0-10  Pain Score: 2  Pain Location: Knee  Pain Orientation: Left      Subjective  General  Response to Previous Treatment: Patient with no complaints from previous session., Compliant with home exercise program  Pain has been better overall this week, much lower than the past few weeks. She did have some sharper pain this morning, but it has been less frequent than last week.    Objective  Increased discomfort trying single UE support but did not linger    Treatment:  Aquatic Therapy:   Aquatic Exercise Performed: Yes  Walk x3 laps fwd, bwd, s/s across pool    4' depth, cues for TrA brace, x12 ea with single UE on railing  Heel raise  Hip abduction  Hip extension  Hip flexion/SLR  Hamstring curl    March across pool     Deep, 5' with noodle under arms, BUE support:  Decompression x2'  Hip abd/adduction x2'  XC ski x2'   Bike x2'  Decompression x2'    Assessment:  PT Assessment  PT Assessment Results: Decreased strength, Decreased range of motion, Impaired balance, Decreased mobility, Pain  Pt's response to treatment:  shallow exercise progressed with reduced UE support. Pt with some discomfort initially but this improved within session.   Areas of improvements:  lower pain levels this  week  Limitations/deficits:  pain on stairs and with flexion continues    Pain end of session:  0/10    Plan:  Continue with current POC/no changes    Assessment of current progress against goals:  Progressing toward functional goals      Goals:  Active       PT Problem       Goals (Progressing)       Start:  09/28/23    Expected End:  01/25/24       Pt will demonstrate 0-125 deg L knee ROM without pain for improved body mechanics during functional mobility.     Pt will increase strength in LLE by 1/2 MMT in all planes for improved performance of functional mobility.    Pt will ambulate long community distances across all surfaces including grass, inclines, and declines without pain or deviation.    Pt will ascend/descend 2 flights of stairs reciprocally without pain for improved performance of household and community mobility.    Pt will stand for up to one hour without pain and without feelings of imbalance.    Pt will complete 5x sit to  12s without pain for improved functional strength and mobility.

## 2023-11-20 ENCOUNTER — TELEPHONE (OUTPATIENT)
Dept: PRIMARY CARE | Facility: CLINIC | Age: 68
End: 2023-11-20
Payer: COMMERCIAL

## 2023-11-20 NOTE — TELEPHONE ENCOUNTER
Pt called stating her PA for her MRI of her back was denied - she states she received a letter in which [facility] can appeal with [employer] - Case# [id number] -- they can be reached at [phone]

## 2023-11-21 ENCOUNTER — TREATMENT (OUTPATIENT)
Dept: PHYSICAL THERAPY | Facility: CLINIC | Age: 68
End: 2023-11-21
Payer: COMMERCIAL

## 2023-11-21 DIAGNOSIS — S83.412D SPRAIN OF MEDIAL COLLATERAL LIGAMENT OF LEFT KNEE, SUBSEQUENT ENCOUNTER: ICD-10-CM

## 2023-11-21 DIAGNOSIS — S83.412A SPRAIN OF MEDIAL COLLATERAL LIGAMENT OF LEFT KNEE, INITIAL ENCOUNTER: ICD-10-CM

## 2023-11-21 DIAGNOSIS — M76.52 PATELLAR TENDINITIS, LEFT KNEE: ICD-10-CM

## 2023-11-21 DIAGNOSIS — M25.562 PAIN IN LEFT KNEE: ICD-10-CM

## 2023-11-21 PROCEDURE — 97113 AQUATIC THERAPY/EXERCISES: CPT | Mod: GP

## 2023-11-21 ASSESSMENT — PAIN - FUNCTIONAL ASSESSMENT: PAIN_FUNCTIONAL_ASSESSMENT: 0-10

## 2023-11-21 ASSESSMENT — PAIN SCALES - GENERAL: PAINLEVEL_OUTOF10: 2

## 2023-11-21 NOTE — PROGRESS NOTES
Physical Therapy Treatment    Patient Name: Marietta Avilez  MRN: [id number]  Encounter date:  11/21/2023  Time Calculation  Start Time: 1255  Stop Time: 1340  Time Calculation (min): 45 min    Visit Number:  13/20   Visit Authorized:  40/yr (18 used prior to this series)    Current Problem  1. Sprain of medial collateral ligament of left knee, initial encounter  Follow Up In Physical Therapy      2. Sprain of medial collateral ligament of left knee, subsequent encounter  Follow Up In Physical Therapy      3. Patellar tendinitis, left knee  Follow Up In Physical Therapy      4. Pain in left knee  Follow Up In Physical Therapy            Precautions  Precautions  Precautions Comment: none    Pain  Pain Assessment: 0-10  Pain Score: 2  Pain Location: Knee  Pain Orientation: Left      Subjective  General  Response to Previous Treatment: Patient with no complaints from previous session., Compliant with home exercise program  Pain is still present distal to knee joint line, but instances of sharp pain have decreased over the past week. She was scheduled to get an MRI of her spine to determine if that could be a contributor, but this was denied by insurance.     Objective  Mild hip strategy with marching    Treatment:  Aquatic Therapy:   Aquatic Exercise Performed: Yes  Walk x3 laps fwd, bwd, s/s across pool    4' depth, cues for TrA brace, x12 ea with single UE on railing  Heel raise  Hip abduction  Hip extension  Hip flexion/SLR  Hamstring curl    March across pool x3 laps with noodle support    Deep, 5' with noodle under arms, BUE support:  Decompression x2'  Hip abd/adduction x2'  XC ski x2'   Bike x2'  Decompression x2'    Assessment:  PT Assessment  PT Assessment Results: Decreased strength, Decreased range of motion, Impaired balance, Decreased mobility, Pain  Pt's response to treatment:  Attempted marching without support however pt with some imbalance, this improved with use of noodle for support. Pt with better  gait pattern in pool and on pool deck without significant deviation.  Areas of improvements:  Fewer instances of sharp pain  Limitations/deficits: discomfort persists on stairs    Pain end of session:  0/10    Plan:  Continue with current POC/no changes    Assessment of current progress against goals:  Progressing toward functional goals      Goals:  Active       PT Problem       Goals (Progressing)       Start:  09/28/23    Expected End:  01/25/24       Pt will demonstrate 0-125 deg L knee ROM without pain for improved body mechanics during functional mobility.     Pt will increase strength in LLE by 1/2 MMT in all planes for improved performance of functional mobility.    Pt will ambulate long community distances across all surfaces including grass, inclines, and declines without pain or deviation.    Pt will ascend/descend 2 flights of stairs reciprocally without pain for improved performance of household and community mobility.    Pt will stand for up to one hour without pain and without feelings of imbalance.    Pt will complete 5x sit to  12s without pain for improved functional strength and mobility.

## 2023-11-28 ENCOUNTER — APPOINTMENT (OUTPATIENT)
Dept: PHYSICAL THERAPY | Facility: CLINIC | Age: 68
End: 2023-11-28
Payer: COMMERCIAL

## 2023-11-28 NOTE — PROGRESS NOTES
Physical Therapy Treatment    Patient Name: Marietta Avilez  MRN: [id number]  Encounter date:  11/28/2023       Visit Number:  14/20   Visit Authorized:  40/yr (18 used prior to this series)    Current Problem  No diagnosis found.        Precautions       Pain         Subjective  General     ***    Objective  ***    Treatment:  Aquatic Therapy:      Walk x3 laps fwd, bwd, s/s across pool    4' depth, cues for TrA brace, x12 ea with single UE on railing  Heel raise  Hip abduction  Hip extension  Hip flexion/SLR  Hamstring curl    March across pool x3 laps with noodle support    Deep, 5' with noodle under arms, BUE support:  Decompression x2'  Hip abd/adduction x2'  XC ski x2'   Bike x2'  Decompression x2'    Assessment:     Pt's response to treatment:  ***.  Areas of improvements:  ***  Limitations/deficits: ***    Pain end of session:  0/10    Plan:  Continue with current POC/no changes    Assessment of current progress against goals:  Progressing toward functional goals      Goals:

## 2023-11-29 PROBLEM — M25.862 PATELLOFEMORAL DYSFUNCTION OF LEFT KNEE: Status: ACTIVE | Noted: 2023-11-29

## 2023-11-29 PROBLEM — M21.079 EVERSION DEFORMITY OF FOOT: Status: ACTIVE | Noted: 2023-11-29

## 2023-11-29 PROBLEM — S83.8X2A INJURY OF MENISCUS OF LEFT KNEE: Status: ACTIVE | Noted: 2023-11-29

## 2023-11-29 PROBLEM — L03.116 CELLULITIS OF KNEE, LEFT: Status: ACTIVE | Noted: 2023-11-29

## 2023-11-29 NOTE — PROGRESS NOTES
"Verbal consent of the patient and/or verbal parental consent for patients under the age of 18 have been obtained to conduct a physical examination at this office visit.    Established patient  History Of Present Illness  12/06/23 Marietta Avilez is a 68 y.o. female who presents for re-evaluation of her LEFT knee and evaluation of her Lumbar Spine.  She is 3 months s/p regenerative injections and Euflexxa injections into her LEFT knee. Marietta did get mild relief of LEFT Knee pain s/p injections; however, she states that her LEFT knee pain is getting worse recently around her LEFT patella as well as along the medial joint line. She has some swelling in her LEFT knee, but denies any instability in the LEFT knee. She states that she has \"nerve\" pain along the anterior aspect of her LEFT Knee.  She has been working in physical therapy nonstop since in August not only for her knees but also her lumbar spine and is not getting any better.  Physical therapy has sent her back in for further evaluation and workup because they feel they cannot do anything else for her lumbar spine or needs to really figure out what else is going on so they want her further evaluated and worked up with further testing preferably MRI of the lumbar spine. Additionally, She does feel like her balance is off due to her knees and feels like it is more due to her back pain radiating down into her legs. She has c/o bilateral radiculopathy LEFT > RIGHT that has not improved despite physical therapy and OTC Liquid NSAIDS and Tylenol has not helped. She is unable to take any oral medication as she states she can't swallow pills and therefore, can not take higher doses of medication or the supplements that were recommended previously.    Marietta is currently still in aquatic therapy for her Left knee and low back and continues to perform her home exercise program. She has been attending physical therapy including aquatic therapy continually since August 2023 " "with no improvement and is currently c/o shooting pain down her LEFT leg as well as numbness and tingling.  Her pain is 2/10 currently, but is exacerbated with prolonged walking, standing, stair climbing, bending and squatting. She often feels off balance/unsteady due to her low back pain.      Last Recorded Vitals  /86 (BP Location: Right arm, Patient Position: Sitting, BP Cuff Size: Adult)   Ht 1.626 m (5' 4\")   Wt 80.7 kg (178 lb)   BMI 30.55 kg/m²      All previous Progress Notes and imaging results related to this patients chief complaint have been reviewed in preparation for this examination.    Past Medical History  She has a past medical history of History of back surgery.    Surgical History  She has no past surgical history on file.     Social History  She reports that she has never smoked. She has never used smokeless tobacco. She reports that she does not drink alcohol and does not use drugs.    Family History  No family history on file.     Allergies  Patient has no known allergies.    Historical Clinical Intake  February 21, 2023-------ROMULO  Verbal consent of the patient and/or verbal parental consent for patients under the age of 18 have been obtained to conduct a physical examination at this office visit. Newly established patient. Marietta, a 67 year old, visited the clinical today for her LEFT knee. Marietta brought her  to her appointment today. Marietta states she was in [place] and was walking on an uneven sidewalk and tripped and fell onto her LEFT knee in a flexed position on February 6, 2023. She states this is the first time she has been seen for this injury, therefore she did not have previous x-rays. Marietta states she has no previous medical history of any knee injuries. Marietta rated her pain as a 0/10 today stating she experiences discomfort, not necessarily pain. She was tender to palpate on the superior pole of the patella where she had an abrasion from the fall. Marietta states " "initially her LEFT knee was bleeding. Marietta stated when she fell it was a 10/10 pain. Marietta denies any pops, snaps or cracks as well as any numbness/tingling or pins and needles. She did note sometime last week she felt a \"pinched nerve\" while walking. She denies trying any current treatments for pain relief. She experienced no pain with knee flexion/extension.  -----------------------------  May 10, 2023 Above note reviewed. Verbal consent of the patient and/or verbal parental consent for patients under the age of 18 have been obtained to conduct a physical examination at this office visit. Marietta returns today for follow up of her LEFT knee. She states that she has good and bad days. Rates current pain as a 3/10 describing it as a throbbing pain. She has been referred in by her physical therapist due to a popping that was felt Friday during her physical therapy appointment. Pt also notes that she has been experiencing \"crackling\" feeling as well in her knee. We discussed treatment options. Patient has had several weeks of physical therapy and was making progress but has had a sudden relapse with significant increase in pain. This is concerning for more significant disease the previously considered as such we will order an MRI for further evaluation of her knee with consideration of meniscus versus MCL injury. Patient verbalizes understanding and agreement with plan of care.  -----------------------------  June 7, 2023 Above notes reviewed. Verbal consent of the patient and/or verbal parental consent for patients under the age of 18 have been obtained to conduct a physical examination at this office visit. Marietta returns today for follow up of her MRI of her LEFT knee. Pt states that she feels the same since her previous visit. Rates current pain as a 2-3/10 noting that her pain is always there. Pt completed physical therapy as of Friday last week. We reviewed patient MRI results in detail. Patient verbalizes " understanding of results. We discussed treatment options and will fit patient for OA unloading brace with medial pressure and will have patient follow-up with Dr. Faulkner to discuss therapeutic injections. Patient will continue with physical therapy or home exercise plan and can take over-the-counter medications for pain. Patient verbalizes understanding and agreement with plan of care.  -----------------------------  July 20, 2023 Above notes reviewed. Verbal consent of the patient and/or verbal parental consent for patients under the age of 18 have been obtained to conduct a physical examination at this office visit. Marietta, a newly established patient, returns today for Prolozone #1 injection into the LEFT knee. She is c/o numbness in her LEFT leg from the anterior aspect of her knee radiating down to her foot. Denies any locking/catching or instability in her LEFT Knee. Pain is currently 3/10. She says she still having a lot of pain in her knee at times however additionally she feels that other times it is radiating down into her foot and she describes it as a numbness and tingling. She also says going up and down steps hurts more than normal. I told her most likely she is dealing with 2 different things some arthritic changes in her knee as well as her meniscus injuries as well as additionally something going on from her lumbar spine radiating down her leg and into her foot. She is going to schedule an appointment for us to workup her lumbar spine in the future. Also she says she has never heard from the bracing representative yet for her custom OA unloading knee brace which I told her we would look into for her. We also talked in detail about different supplements she can take to help with the arthritis however she says she cannot take pills at all however I talked to her into looking into liquid forms of glucosamine chondroitin with hyaluronic acid and if not I told her she can grind up the pills and put them  in applesauce.  -----------------------------  July 26,2023 Above notes reviewed. Verbal consent of the patient and/or verbal parental consent for patients under the age of 18 have been obtained to conduct a physical examination at this office visit. Marietta, a newly established patient, returns today for Euflexxa#1. Marietta is currently 3/10 pain to Left knee described as aching primarily to the medial anterior and posterior knee. She confirms she occasionally has tingling and numbness to the LEFT leg. She is unable to swallow pills so she doesn't take anything for pain, but she does apply ice and heat to the area. She is in the process of scheduling her physical therapy.  -----------------------------  August 3, 2023 All notes reviewed. Verbal consent of the patient and/or verbal parental consent for patients under the age of 18 have been obtained to conduct a physical examination at this office visit. Marietta is a recently established patient who presents for Prolozone #2 injection into her LEFT knee. she says that she was a little sore after her last injection but overall she is doing okay. She says she noticed a difference in her range of motion and she is definitely not as painful.  -----------------------------  August 31, 2023 All notes reviewed. Verbal consent of the patient and/or verbal parental consent for patients under the age of 18 have been obtained to conduct a physical examination at this office visit. Marietta is a recently established patient who is here today for her Euflexxa #2 injection into her LEFT knee. Marietta states that her LEFT knee is doing considerably better since she started the viscosupplementation injections and regenerative injections in conjunction with physical therapy. She is now walking without a limp and denies any swelling, locking, catching, instability in her LEFT knee. She is unable to swallow pills and therefore is only using moist heat as needed for pain management. She rates  LEFT knee pain currently at 2/10.  -----------------------------  Di 7, 2023 Above notes reviewed. Verbal consent of the patient and/or verbal parental consent for patients under the age of 18 have been obtained to conduct a physical examination at this office visit. Marietta is an established patient who presents for Prolozone #3 injection into her LEFT knee. She attends Physical Therapy at [facility] [place] with aquatics, which she notes is most helpful. She reports diffuse swelling throughout her left knee over this past week. She notes recent weather fluctuations and cooler temperatures have caused her pain to increase to a reported 3/10 today. She is not presently utilizing any pain management interventions.  -----------------------------  September 14, 2023 Above notes reviewed. Verbal consent of the patient and/or verbal parental consent for patients under the age of 18 have been obtained to conduct a physical examination at this office visit. Marietta, an established patient, who is here today for Euflexxa #3 injection into her LEFT knee. She states that she continues to have pain that comes and goes. She continues to c/o pain climbing steps. She denies any instability, locking or catching in her left knee. Pain is currently 3/10. She states she is not ready to do the injection today because she thought it was only Prolozone today and she would like to wait to do injection until next week so that she is mentally prepared for it. Additionally, Marietta presents with an area of skin irritation on the anterior aspect of her LEFT knee that appeared 1-2 days after her last Prolozone injection. The lesion appears to be a reaction to the cold spray. There is mild increased redness around the area, but no drainage, odor, or warmth.  -----------------------------  September 27, 2023 Above notes reviewed. Verbal consent of the patient and/or verbal parental consent for patients under the age of 18 have been obtained to conduct a  physical examination at this office visit. Marietta is an established patient who is present today to recieve Euflexxa #3 injections into her LEFT knee. She states that she continues to have on/off pain, today she is experiencing more pain. Rates current pain as a 3/10. She continues with physical therapy and has two sessions left. Pt notes that the aquatic therapy has been greatly helping her she notes. Overall she is very happy with the results of the injections not only with her range of motion but also her strength. She says definitely if she continues to get improvement she would like to do these injections again in the future instead of even thinking about any surgical intervention.    Review of Systems:  CONSTITUTIONAL:   Negative for weight change, loss of appetite, fatigue, weakness, fever, chills, night sweats, headaches .           HEENT:   Negative for cold, cough, sore throat, sinus pain, swollen lymph nodes.           OPHTHALMOLOGY:   Negative for diminished vision, blurred vision, loss of vision, double vision.           ALLERGY:   Negative for runny nose, scratchy throat, sinus congestion, rash, facial pressure, nasal congestion, post-nasal drip.           CARDIOLOGY:   Negative for chest pain, palpitations, murmurs, irregular heart beat, shortness of breath, leg edema, dyspnea on exertion, fatigue, dizziness.           RESPIRATORY:   Negative for chest pain, shortness of breath, swelling of the legs, asthma/copd, chest congestion, pain with breathing .           GASTROENTEROLOGY:   Negative for nausea, vomitting, heartburn, constipation, diarrhea, blood in stool, change in bowel habits, black stool.           HEMATOLOGY/LYMPH:   Negative for fatigue, loss of appetitie, easy bruising, easy bleeding, anemia, abnormal bleeding, slow healing.           ENDOCRINOLOGY:   Negative for polyuria, polydipsia, polyphagia, fatigue, weight loss, weight gain, cold intolerance, heat intolerance, diabetes.            MUSCULOSKELETAL:   Positive  for Left  knee pain and Lumbar Spine pain        DERMATOLOGY:   Negative for rash, bruising.           NEUROLOGY:   Negative for  gait abnormality, paresthesias, weakness, sciatica.      Positive for Bilateral lower extremity tingling, numbness, and radiculopathy LEFT > RIGHT      General (KNEE): Relatively unchanged since physical therapy and injections  Location:  Left>RIGHT .   Erythema: Negative:,  Edema:  Positive:  Effusion: Negative.   Warmth: Negative.   Percussion Test: Negative.   Tuning Fork Test: Negative.   Ecchymosis/Bruising: Negative.   Abrasions: Negative;.   Palpation: Positive:, Tender to palpation over the patella, Media and Lateral Joint Line, MCL  Burgos's Cyst: Negative.   Orientation: Symmetrical.   Range of Motion:  Positive:Painful, Flexion : , Extension:    Strength:  Positive:   +4+5, Quadricep Extension  +5/+5, Hamstring Flexion     +4+5 Hip Flexion  +5/+5 Hip Extension     +3+5 Hip ABduction away from body  +4+5 Hip ADduction towards body     +5/+5 Hip Internal Rotation  +5/+5 Hip External Rotation      +5/+5 IT-Band      +5/+5 Gastrocnemius  +5/+5 Soleus.      DTR/Neurological:   +2/+4 Patellar (L4)  +2/+4 Posterior Tibialis Reflex and Medial Hamstrings (L5)  +2/+4 Achilles (S1).      Sensation/Neurological Lumbar:  Relatively unchanged since physical therapy and injections   Positive: Sensation decreased  L1: Low back, hips, and groin  L2: Low back and front of inside of upper leg/thigh  L3: Low back and front of upper leg/thigh  L4: Low back, front of upper leg/thigh, front of lower leg/calf, front of medial area of knee, and inside of ankle Decreased LEFT   L5: Low back, front and lateral knee, front and outside of lower leg/calf, top and bottom of foot and first four toes especially big toe.  Decreased LEFT         Sensation/Neurological Sacrum:  Relatively unchanged since physical therapy and injections  Positive: Sensation decreased   Positive: S1:  low back, back of upper leg/thigh, back and inside of lower leg, calf and little toe Decreased LEFT   S2: Buttocks, genitals, back of upper leg/thigh and calves  S3: Buttocks and genitals  S4: Buttocks  S5: Buttocks.   Pain with Squat: Positive   Pain with Sumo Squat: Positive:   Vascular:   +2/+4 Femoral, +2/+4 Dorsalis Pedis, +2/+4 Posterior Tibial  Capillary Refill less than 2 seconds.   Feet/Foot:  Valgus Foot Deformity (Pes Planus) Bilateral.      Knee - ACL:  Anterior Drawer Test: Negative.   Lachman Test: Negative.   Prone Lachman Test: Negative.   Lelli Test: Negative.   Pivot Shift Test: Negative.   Crossover Test: Negative.      KNEE - FAT PAD: Relatively unchanged since physical therapy and injections  Squeeze Test Knee Bent going into extension: Positive     KNEE - IT BAND:  Julio Cesar Test: Negative.   Noble Test: Negative.      KNEE - MCL / LCL: Relatively unchanged since physical therapy and injections  Valgus Stress Test:  Positive:, 20-30 degrees  Varus Stress Test:  90 degrees: Negative, 20-30 degrees: Negative.    Apley Distraction Test:  Negative.   Thessaly Test:  Positive    KNEE - MENISCUS:Relatively unchanged since physical therapy and injections  Apley Compression Test:  Negative.   Duck Walk Test:  Positive    Stienmann 1 Test:  Positive    Stienmann 2 Test:  Negative.   Everardo Test:  Positive  Bragard's Test:  Negative.   Bounce Home Test:  Positive  Payr Test:  Negative.   Thessaly Test:  Positive  Kyaw's Test:  Negative.      KNEE - PATELLA:Relatively unchanged since physical therapy and injections  Apprehension Test: Negative.   Glide Test: Positive:.   Facet Tenderness Test: Negative.   Medial Patellar Plica Test: Negative.   Grind Test: Negative.   Patella Tracking Test: Negative.   Medial/Lateral Subluxation Test: Negative.   Suppression Test: Negative.      Knee - PCL/POSTERIOR LATERAL CORNER:  Posterior Drawer Test: Negative.   Hammer 90/90 (Sag Sign) Test: Negative.   Dial Test:  Negative.   Reverse Lachman Test: Negative.   Quad Activation Test: Negative.   ER / Recurvatum Test: Negative.   Reverse Pivot Shift Test: Negative.      KNEE - POPLITEUS:  Jonathan's Test: Negative.      KNEE - QUADRICEPS:  Dreyer Test: Negative.   VMO Test: Negative.    VLO Test: Negative.      Hip/Pelvis - Sacrum:  Leg Length Supine:  Negative.   Leg Length Supine to Seated (Derbolowsky Sign):  Negative.   Standing Flexion Test:  Negative.   Seated Flexion Test:  Negative.   Spring Test:  Negative.   Sacral Somatic Dysfunction:  Negative.   Sacroiliac (SI) Joint Fixation Test:  Negative.   Hip Flexor Tightness:  Negative.   Hamstring Tightness:  Negative.      Imaging and Diagnostics Review:  PROCEDURE: KNEE LT WO - TMR 2204  REASON FOR EXAM: SPRAIN OF OTHER SPECIFIED PARTS OF LEFT KNEE, SUBS ENCNTR   RESULT: MRN: [id number]     Patient Name: SHAKILA ESQUEDA  STUDY:KNEE LT WO 5/31/2023 6:02 pm  INDICATION:SPRAIN OF OTHER SPECIFIED PARTS OF LEFT KNEE, SUBS ENCNTR pain fall anterior knee  COMPARISON:02/21/2023     ACCESSION NUMBER(S):[id number]   ORDERING CLINICIAN:EYLENA MENENDEZ     STUDY:Multiplanar and multisequence MR images were obtained of the left knee.      FINDINGS:  Anterior cruciate ligament is intact.        Posterior cruciate ligament is intact.        Lateral meniscus demonstrates a free edge tear of the posterior horn of the lateral meniscus about the body junction (series 7, image 5 and series 9, image 17).         Medial meniscus demonstrates a blunted free edge of the posterior horn of the medial meniscus component which is likely on a degenerative basis.          Correlate with patient's history including partial meniscectomy. The body of the medial meniscus is unremarkable with mild meniscal degeneration.          No longitudinal tear       Medial collateral ligament complex is unremarkable         Lateral supporting structures are intact. There is no edema in the posterolateral corner          Extensor mechanism is intact. Mild patellar tendinosis demonstrated.           Patellofemoral articulation demonstrates mild thinning of the articular cartilage particularly within the lateral facet of the patella inferiorly. Trochlea demonstrates mild thinning of the cartilage along the outer margin medial trochlea. No cartilage defect      Lateral femorotibial compartment demonstrates mild thinning of the cartilage along the outer margin weight-bearing portion of the compartment. Medial femorotibial compartment demonstrates denuded articular cartilage within the weight-bearing portion of the compartment.           There is osteophytosis along the outer margin of the compartment. Minimal subchondral intermediate signal intensity. Also, component of severe cartilage thinning along the posterior nonweightbearing femoral condyle           There is no knee joint effusion. No intra-articular bodies demonstrated.            Subtle popliteal cyst demonstrated.       IMPRESSION:  1. Medial femorotibial compartment osteoarthritis with severe loss of articular cartilage within the weight-bearing portion of the compartment.  2. Free edge tear of the posterior horn lateral meniscus about the body junction.  3. Blunted free edge free edge irregularity of the posterior horn of the medial meniscus on a degenerative basis. Mild peripheral extrusion of the body medial meniscus.            Dictation workstation: [id number]   Original Interpreting Physician: ANGELA NUGENT MD  Original Transcribed by/Date: MMODAL May 31 2023 4:55P  -----------------------------------------------------  PROCEDURE: KNEE LT MIN 4 VIEW - TXR 0182  REASON FOR EXAM: ACUTE PAIN OF LEFT KNEE  RESULT: MRN: [id number]     Patient Name: SHAKILA ESQUEDA  STUDY:KNEE LT MIN 4 VIEW; 2/21/2023 8:50 am  INDICATION:ACUTE PAIN OF LEFT KNEE. Status post fall 2 weeks ago. Anterior pain and swelling.  COMPARISON:None.     ACCESSION NUMBER(S):[id number]  ORDERING  CLINICIAN:YELENA MENENDEZ     TECHNIQUE:Left knee four views     FINDINGS:  No fractures or destructive lesions are identified. There is medial tibiofemoral marginal spurring. No effusion is identified.     IMPRESSION:  Medial tibiofemoral osteoarthritis.     Dictation workstation: [id number]  Original Interpreting Physician: BINDU TORRES M.D.  Original Transcribed by/Date: MMODAL Feb 21 2023 8:35A    Examination: Relatively unchanged since physical therapy and injections   Lumbar Spine  LEFT with Radiculitis     Edema: Negative.   TART Findings: Positive  Tissue Texture Changes,Assymmetry,Restriction,Tenderness paraspinal muscles lower lumbar spine.   Ecchymosis/Bruising: Negative.   Percussion Test (LUMBAR): Negative.   Tuning Fork Test (LUMBAR): Negative.   Percussion (Sacrum): Negative.   Tuning Fork (Sacrum): Negative.     Orientation: Relatively unchanged since physical therapy and injections  Orientation (LUMBAR): Positive  Decreased Lumbar Lordosis due to muscle spasms.   Orientation (Sacrum):  Positive  Decreased Sacral Flexion/Extension.     ROM (LUMBAR):   Positive  Decreased due to pain, Forward Flexion, Extension, Lateral Bending (Side Bending), and Twisting (Rotation).     Muscle Strength:   5+/5 Hamstring Flexion  +4/+5 Quadricep Extension  +4+5 Hip Flexion  +5/+5 Hip Extension  +3/+5 Hip ABduction toward body  +4+5 Hip ADduction away from body               +5/+5 Hip Internal Rotation at 90 Degrees  +5/+5 Hip External Rotation at 90 Degrees  +5/+5 Hip Internal Rotation at 0 Degrees  +5/+5 Hip External Rotation at 0 Degrees.            DTR/Neurological:    +2/+4 Patellar Reflex (L-4)  +2/+4 Posterior Tibialis and Medial Hamstrings Reflex (L5)  +2/+4 Achilles Reflex (S-1).     Sensation/Neurological Lumbar:   Relatively unchanged since physical therapy and injections  Positive  Decreased LEFT     Negative L1: Low back, hips, and groin  Negative L2: Low back and front of inside of upper  leg/thigh  Negative L3: Low back and front of upper leg/thigh  Positive L4: Low back, front of upper leg/thigh, front of lower leg/calf, front of medial area of knee, and inside of ankle  Decreased LEFT     Positive L5: Low back, front and lateral knee, front and outside of lower leg/calf, top and bottom of foot and first four toes especially big toe. Decreased LEFT              Sensation/Neurological Sacrum:   Relatively unchanged since physical therapy and injections  Positive   Decreased LEFT   Positive S1: low back, back of upper leg/thigh, back and inside of lower leg, calf and little toe  Decreased LEFT   Negative S2: Buttocks, genitals, back of upper leg/thigh and calves  Negative S3: Buttocks and genitals  Negative S4: Buttocks  Negative S5: Buttocks.            Sensation/Neurological Coccygeal:    Negative  Sensation Intact, 2-Point Discrimination: Negative   Negative Coccyx: Buttocks and area of tailbone.            Palpation:  Negative Tenderness to Palpation.            Vascular:   Capillary Refill < 2 seconds  +2/+4Carotid  +2/+4 Dorsalis Pedis  +2/+4 Posterior Tibial.             Low Back-Disc Injury: Relatively unchanged since physical therapy and injections  Valsalva Maneuver: Negative.   Lhermitte's Sign: Negative.   Fermoral Nerve Traction Test: Negative.   Slump Test: Positive: Left > Right   Cross Test Seated: Negative.   Laseague Sign:  Positive: Left > Right    [place] Differential Test: Negative.   [place] Drop Test:  Positive: Left > Right   Seated [place] Test:  Positive: Left > Right   Reverse [place]ague Test: Negative.   Bonnet Piriformis Test: Negative.   Bragard Test: Negative.   Duchenne Trendelenberg Test: Negative.   Kernig-Brudzinski Test: Negative.   Tip Toe Heel Walking Test: Negative.   Mirna Prone Knee Flexion Test: Negative.            Low Back-Hip:  Corby/MIGUEL Test: Negative.   FADIR Test: Negative.   Iliolumbar Ligament Test: Negative.   Sacrospinous and SI Ligament  Test: Negative.   Sacrotuberal Ligament Test: Negative.   Psoas Sign: Negative.         Low Back-Sciatica:  Bundy Test: Negative.         Low Back-SI Joint:  Three-Phase Hyperextension Test: Negative.   Spine Test: Negative.   Yeoman Test: Negative.   Chaitanya Test: Negative.   Sacroilliac Stress Test: Negative.   Abduction Stress Test: Negative.         Low Back-Spondy:  Stork Test: Negative.   Sphinx Test: Negative.   Modified Sphinx Test:  Positive: Left > Right     Hip/Pelvis - Sacrum: Relatively unchanged since physical therapy and injections  Standing Flexion Test: Positive  Left   Seated Flexion Test: Positive  Left   Spring Test: Negative   Sacral Somatic Dysfunction: Positive RrRa: Right  rotation Right axis  Hip Flexor Tightness: Positive Right >  Left   Hamstring Tightness: Positive Left > Right     Leg Length:  Leg Length Supine: Positive LEFT leg shorter than the Right , during Physical Examination, and Will verify with standing erect pelvis xray   Leg Length Supine to Seated (Derbolowsky Sign): Positive LEFT leg shorter than the Right , during Physical Examination, and Will verify with standing erect pelvis xray     Feet/Foot:   Positive   Valgus foot BILATERAL     Assessment   1. Lumbar back pain with radiculopathy affecting lower extremity  XR lumbar spine complete 4+ views    XR pelvis 1-2 views    MR lumbar spine wo IV contrast      2. Acute pain of left knee        3. Injury of meniscus of left knee, subsequent encounter        4. Tear of lateral meniscus of left knee, current, unspecified tear type, subsequent encounter        5. Tear of medial meniscus of left knee, current, unspecified tear type, subsequent encounter        6. Patellar tendonitis of left knee        7. Sprain of medial collateral ligament of left knee, subsequent encounter        8. Primary osteoarthritis of left knee        9. Popliteal cyst, left        10. Cellulitis of knee, left        11. Patellofemoral dysfunction of  left knee        12. DDD (degenerative disc disease), lumbar  MR lumbar spine wo IV contrast      13. Lumbar spondylosis  MR lumbar spine wo IV contrast      14. Discitis of lumbar region  MR lumbar spine wo IV contrast      15. Sprain and strain of lumbosacral joint/ligament, initial encounter  MR lumbar spine wo IV contrast      16. Eversion deformity of foot, unspecified laterality        17. Leg length discrepancy  XR pelvis 1-2 views    Median difference of the left leg being shorter than right leg is approximately:  5.07 mm          Treatment or Intervention:    Continue to alternate ice and moist heat as needed  ,  Finish remaining Physical Therapy visits with manual therapy as well as dry needling and IASTM    Continue to perform home exercises routinely    Once again, stressed the importance of wearing shoes with good stability control to help with the biomechanics affecting the knees as well as the lower extremities   Once again, stressed the importance of wearing full foot insoles to help with the biomechanics affecting the knees as well as the lower extremities,  Once again, stressed the importance of wearing full foot insoles to help with the biomechanics affecting the knees as well as the lower extremities   Once again, recommendation over-the-counter calcium with vitamin-D 2 -3000+ milligrams a day, as well as OTC symphytum as directed daily to promote bony healing, in addition to a daily multivitamin.   Once again, recommendation over-the-counter curcumin, turmeric, boswellia, as well as egg shell membrane as directed to aid with joint inflammation.   Once again, recommendation over-the-counter Move Free for joint health.    Once again, the patient may alternate OTC Advil Liquid with OTC Tylenol Extra Strength or OTC Tylenol Arthritis, taking one every 6-8 hours with food as needed.    Discussed purchasing a pill  and taking supplements and medication with applesauce as patient states she  can't swallow pills.    Patient advised regarding the risks and/or potential adverse reactions and/or side effects of any prescribed medications along with any over-the-counter medications or any supplements used. Patient advised to seek immediate medical care if any adverse reactions occur. The patient and/or patient(s) parent(s) verbalized their understanding,   Discussed in detail with the patient to the level of their understanding the possibility in the future of regenerative injections versus corticosteroid injections versus viscosupplementation injections versus a combination   At the patient's next office visit, will provide appropriate __ millimeter heel lift to be placed in the RIGHT/LEFT shoe to accommodate for leg length discrepancy found on standing erect pelvis xray     MRI of the lumbar spine to rule out ligament injury versus herniated/bulging disc versus stenosis versus fracture.     Diagnostic studies:  X-ray lumbar spine ordered my personal over read is as follows  Severe DJD/DDD lower lumbar spine L4-L5 and L5-S1  2. Mild scoliosis lumbar spine  ------------------------------------------------------------------------  Left leg shorter than right leg by the following:  Iliac crest:  4.0 mm  Sacral base:  3.6 mm  Midline femoral heads:  7.6 mm  Median difference of the left leg being shorter than right leg is approximately:  5.07 mm     Standing erect pelvis x-ray ordered    Activity Instructions, Restrictions, and Accommodations:  The family has been provided a note (after visit summary) outlining all current activity instructions, restrictions, and accommodations.    Consultations/Referrals:  None at this time.    Follow-up after Lumbar spine MRI, sooner if needed.  Please note that this report has been produced using speech recognition software.  It may contain errors related to grammar, punctuation or spelling.  Electronically signed, but not reviewed.  Richard Faulkner D.O. FAOASM, Director of  Sports Medicine     RUBIN GOLDSMITH on 12/6/23 at 1:03 PM.     Rubin TRACEY. Kaushik SAEED, FAOASM

## 2023-11-30 ENCOUNTER — APPOINTMENT (OUTPATIENT)
Dept: SPORTS MEDICINE | Facility: CLINIC | Age: 68
End: 2023-11-30
Payer: COMMERCIAL

## 2023-12-01 ENCOUNTER — TREATMENT (OUTPATIENT)
Dept: PHYSICAL THERAPY | Facility: CLINIC | Age: 68
End: 2023-12-01
Payer: COMMERCIAL

## 2023-12-01 ENCOUNTER — APPOINTMENT (OUTPATIENT)
Dept: RADIOLOGY | Facility: CLINIC | Age: 68
End: 2023-12-01
Payer: COMMERCIAL

## 2023-12-01 DIAGNOSIS — S83.412D SPRAIN OF MEDIAL COLLATERAL LIGAMENT OF LEFT KNEE, SUBSEQUENT ENCOUNTER: ICD-10-CM

## 2023-12-01 DIAGNOSIS — M25.562 PAIN IN LEFT KNEE: ICD-10-CM

## 2023-12-01 DIAGNOSIS — M76.52 PATELLAR TENDINITIS, LEFT KNEE: ICD-10-CM

## 2023-12-01 DIAGNOSIS — S83.412A SPRAIN OF MEDIAL COLLATERAL LIGAMENT OF LEFT KNEE, INITIAL ENCOUNTER: ICD-10-CM

## 2023-12-01 PROCEDURE — 97113 AQUATIC THERAPY/EXERCISES: CPT | Mod: GP

## 2023-12-01 ASSESSMENT — PAIN - FUNCTIONAL ASSESSMENT: PAIN_FUNCTIONAL_ASSESSMENT: 0-10

## 2023-12-01 ASSESSMENT — PAIN SCALES - GENERAL: PAINLEVEL_OUTOF10: 1

## 2023-12-01 NOTE — PROGRESS NOTES
"Physical Therapy Treatment    Patient Name: Marietta Avilez  MRN: [id number]  Encounter date:  12/1/2023  Time Calculation  Start Time: 1300  Stop Time: 1346  Time Calculation (min): 46 min    Visit Number:  14/20   Visit Authorized:  40/yr (18 used prior to this series)    Current Problem  1. Sprain of medial collateral ligament of left knee, initial encounter  Follow Up In Physical Therapy      2. Sprain of medial collateral ligament of left knee, subsequent encounter  Follow Up In Physical Therapy      3. Patellar tendinitis, left knee  Follow Up In Physical Therapy      4. Pain in left knee  Follow Up In Physical Therapy          Precautions  Precautions  Precautions Comment: none    Pain  Pain Assessment: 0-10  Pain Score: 1      Subjective  General  Response to Previous Treatment: Patient with no complaints from previous session., Compliant with home exercise program  Pain is okay right now however pt is still having intermittent nerve symptoms down both legs. She is still waiting for authorization for lumbar MRI.    Objective  R hip/low back pain on stairs    Treatment:  Aquatic Therapy:   Aquatic Exercise Performed: Yes  Walk x3 laps fwd, bwd, s/s across pool    4' depth, cues for TrA brace, x20 ea with single UE on railing  Heel raise  Hip abduction  Hip extension  Hip flexion/SLR  Hamstring curl    Hamstring stretch at stairs 2x30\" ea  March across pool x3 laps with noodle support - tried without support however pt did not feel stable)    Deep, 5' with noodle under arms, BUE support:  Decompression x2'  Hip abd/adduction x2'  XC ski x2'   Bike x2'  Decompression x2'    Assessment:  PT Assessment  PT Assessment Results: Decreased strength, Decreased range of motion, Impaired balance, Decreased mobility, Pain  Pt's response to treatment:  Pt with good knee pain recently however back pain/R hip symptoms more prevalent. These improved in pool however still were present upon exit. Pt tolerated increase in " repetitions without adverse response.  Areas of improvements:  good tolerance to exercise progression  Limitations/deficits: continued lumbar symptoms    Pain end of session:  0/10    Plan:  Continue with current POC/no changes    Assessment of current progress against goals:  Progressing toward functional goals      Goals:  Active       PT Problem       Goals (Progressing)       Start:  09/28/23    Expected End:  01/25/24       Pt will demonstrate 0-125 deg L knee ROM without pain for improved body mechanics during functional mobility.     Pt will increase strength in LLE by 1/2 MMT in all planes for improved performance of functional mobility.    Pt will ambulate long community distances across all surfaces including grass, inclines, and declines without pain or deviation.    Pt will ascend/descend 2 flights of stairs reciprocally without pain for improved performance of household and community mobility.    Pt will stand for up to one hour without pain and without feelings of imbalance.    Pt will complete 5x sit to  12s without pain for improved functional strength and mobility.

## 2023-12-01 NOTE — TELEPHONE ENCOUNTER
Patient called again to check the status of and see if there has been any progress made with appealing the MRI denial.

## 2023-12-04 ENCOUNTER — APPOINTMENT (OUTPATIENT)
Dept: SPORTS MEDICINE | Facility: CLINIC | Age: 68
End: 2023-12-04
Payer: COMMERCIAL

## 2023-12-06 ENCOUNTER — OFFICE VISIT (OUTPATIENT)
Dept: SPORTS MEDICINE | Facility: CLINIC | Age: 68
End: 2023-12-06
Payer: COMMERCIAL

## 2023-12-06 ENCOUNTER — ANCILLARY PROCEDURE (OUTPATIENT)
Dept: RADIOLOGY | Facility: CLINIC | Age: 68
End: 2023-12-06
Payer: COMMERCIAL

## 2023-12-06 VITALS
BODY MASS INDEX: 30.39 KG/M2 | HEIGHT: 64 IN | SYSTOLIC BLOOD PRESSURE: 142 MMHG | WEIGHT: 178 LBS | DIASTOLIC BLOOD PRESSURE: 86 MMHG

## 2023-12-06 DIAGNOSIS — S83.282D TEAR OF LATERAL MENISCUS OF LEFT KNEE, CURRENT, UNSPECIFIED TEAR TYPE, SUBSEQUENT ENCOUNTER: ICD-10-CM

## 2023-12-06 DIAGNOSIS — S33.9XXA SPRAIN AND STRAIN OF LUMBOSACRAL JOINT/LIGAMENT, INITIAL ENCOUNTER: ICD-10-CM

## 2023-12-06 DIAGNOSIS — M46.46 DISCITIS OF LUMBAR REGION: ICD-10-CM

## 2023-12-06 DIAGNOSIS — M25.562 ACUTE PAIN OF LEFT KNEE: ICD-10-CM

## 2023-12-06 DIAGNOSIS — S83.412D SPRAIN OF MEDIAL COLLATERAL LIGAMENT OF LEFT KNEE, SUBSEQUENT ENCOUNTER: ICD-10-CM

## 2023-12-06 DIAGNOSIS — M21.70 LEG LENGTH DISCREPANCY: ICD-10-CM

## 2023-12-06 DIAGNOSIS — M54.16 LUMBAR BACK PAIN WITH RADICULOPATHY AFFECTING LOWER EXTREMITY: Primary | ICD-10-CM

## 2023-12-06 DIAGNOSIS — M25.862 PATELLOFEMORAL DYSFUNCTION OF LEFT KNEE: ICD-10-CM

## 2023-12-06 DIAGNOSIS — S83.8X2D INJURY OF MENISCUS OF LEFT KNEE, SUBSEQUENT ENCOUNTER: ICD-10-CM

## 2023-12-06 DIAGNOSIS — M54.16 LUMBAR BACK PAIN WITH RADICULOPATHY AFFECTING LOWER EXTREMITY: ICD-10-CM

## 2023-12-06 DIAGNOSIS — M51.36 DDD (DEGENERATIVE DISC DISEASE), LUMBAR: ICD-10-CM

## 2023-12-06 DIAGNOSIS — M76.52 PATELLAR TENDONITIS OF LEFT KNEE: ICD-10-CM

## 2023-12-06 DIAGNOSIS — L03.116 CELLULITIS OF KNEE, LEFT: ICD-10-CM

## 2023-12-06 DIAGNOSIS — S83.242D TEAR OF MEDIAL MENISCUS OF LEFT KNEE, CURRENT, UNSPECIFIED TEAR TYPE, SUBSEQUENT ENCOUNTER: ICD-10-CM

## 2023-12-06 DIAGNOSIS — M47.816 LUMBAR SPONDYLOSIS: ICD-10-CM

## 2023-12-06 DIAGNOSIS — M71.22 POPLITEAL CYST, LEFT: ICD-10-CM

## 2023-12-06 DIAGNOSIS — M21.079 EVERSION DEFORMITY OF FOOT, UNSPECIFIED LATERALITY: ICD-10-CM

## 2023-12-06 DIAGNOSIS — M17.12 PRIMARY OSTEOARTHRITIS OF LEFT KNEE: ICD-10-CM

## 2023-12-06 PROBLEM — M51.369 DDD (DEGENERATIVE DISC DISEASE), LUMBAR: Status: ACTIVE | Noted: 2023-12-06

## 2023-12-06 PROCEDURE — 1160F RVW MEDS BY RX/DR IN RCRD: CPT | Performed by: FAMILY MEDICINE

## 2023-12-06 PROCEDURE — 1159F MED LIST DOCD IN RCRD: CPT | Performed by: FAMILY MEDICINE

## 2023-12-06 PROCEDURE — 99215 OFFICE O/P EST HI 40 MIN: CPT | Performed by: FAMILY MEDICINE

## 2023-12-06 PROCEDURE — 72110 X-RAY EXAM L-2 SPINE 4/>VWS: CPT | Mod: FY

## 2023-12-06 PROCEDURE — 1125F AMNT PAIN NOTED PAIN PRSNT: CPT | Performed by: FAMILY MEDICINE

## 2023-12-06 PROCEDURE — 1036F TOBACCO NON-USER: CPT | Performed by: FAMILY MEDICINE

## 2023-12-06 PROCEDURE — 72170 X-RAY EXAM OF PELVIS: CPT | Mod: FY

## 2023-12-06 ASSESSMENT — COLUMBIA-SUICIDE SEVERITY RATING SCALE - C-SSRS
1. IN THE PAST MONTH, HAVE YOU WISHED YOU WERE DEAD OR WISHED YOU COULD GO TO SLEEP AND NOT WAKE UP?: NO
6. HAVE YOU EVER DONE ANYTHING, STARTED TO DO ANYTHING, OR PREPARED TO DO ANYTHING TO END YOUR LIFE?: NO
2. HAVE YOU ACTUALLY HAD ANY THOUGHTS OF KILLING YOURSELF?: NO

## 2023-12-06 ASSESSMENT — ENCOUNTER SYMPTOMS
DEPRESSION: 0
LOSS OF SENSATION IN FEET: 0
OCCASIONAL FEELINGS OF UNSTEADINESS: 0

## 2023-12-06 ASSESSMENT — PAIN SCALES - GENERAL
PAINLEVEL: 2
PAINLEVEL_OUTOF10: 2

## 2023-12-06 ASSESSMENT — LIFESTYLE VARIABLES
SKIP TO QUESTIONS 9-10: 1
AUDIT-C TOTAL SCORE: 0
HOW OFTEN DO YOU HAVE A DRINK CONTAINING ALCOHOL: NEVER
HOW MANY STANDARD DRINKS CONTAINING ALCOHOL DO YOU HAVE ON A TYPICAL DAY: PATIENT DOES NOT DRINK
HOW OFTEN DO YOU HAVE SIX OR MORE DRINKS ON ONE OCCASION: NEVER

## 2023-12-06 ASSESSMENT — PAIN DESCRIPTION - DESCRIPTORS: DESCRIPTORS: ACHING

## 2023-12-06 ASSESSMENT — PATIENT HEALTH QUESTIONNAIRE - PHQ9
1. LITTLE INTEREST OR PLEASURE IN DOING THINGS: NOT AT ALL
SUM OF ALL RESPONSES TO PHQ9 QUESTIONS 1 AND 2: 0
2. FEELING DOWN, DEPRESSED OR HOPELESS: NOT AT ALL

## 2023-12-06 ASSESSMENT — PAIN - FUNCTIONAL ASSESSMENT: PAIN_FUNCTIONAL_ASSESSMENT: 0-10

## 2023-12-06 NOTE — PATIENT INSTRUCTIONS
Treatment or Intervention:    Continue to alternate ice and moist heat as needed  ,  Finish remaining Physical Therapy visits with manual therapy as well as dry needling and IASTM    Continue to perform home exercises routinely    Once again, stressed the importance of wearing shoes with good stability control to help with the biomechanics affecting the knees as well as the lower extremities   Once again, stressed the importance of wearing full foot insoles to help with the biomechanics affecting the knees as well as the lower extremities,  Once again, stressed the importance of wearing full foot insoles to help with the biomechanics affecting the knees as well as the lower extremities   Once again, recommendation over-the-counter calcium with vitamin-D 2 -3000+ milligrams a day, as well as OTC symphytum as directed daily to promote bony healing, in addition to a daily multivitamin.   Once again, recommendation over-the-counter curcumin, turmeric, boswellia, as well as egg shell membrane as directed to aid with joint inflammation.   Once again, recommendation over-the-counter Move Free for joint health.    Once again, the patient may alternate OTC Advil Liquid with OTC Tylenol Extra Strength or OTC Tylenol Arthritis, taking one every 6-8 hours with food as needed.    Discussed purchasing a pill  and taking supplements and medication with applesauce as patient states she can't swallow pills.    Patient advised regarding the risks and/or potential adverse reactions and/or side effects of any prescribed medications along with any over-the-counter medications or any supplements used. Patient advised to seek immediate medical care if any adverse reactions occur. The patient and/or patient(s) parent(s) verbalized their understanding,   Discussed in detail with the patient to the level of their understanding the possibility in the future of regenerative injections versus corticosteroid injections versus  viscosupplementation injections versus a combination   At the patient's next office visit, will provide appropriate __ millimeter heel lift to be placed in the RIGHT/LEFT shoe to accommodate for leg length discrepancy found on standing erect pelvis xray     MRI of the lumbar spine to rule out ligament injury versus herniated/bulging disc versus stenosis versus fracture.     Diagnostic studies:  Xrays lumbar spine and standing erect pelvis Xrays ordered today.  MRI lumbar spine ordered today.     Activity Instructions, Restrictions, and Accommodations:  The family has been provided a note (after visit summary) outlining all current activity instructions, restrictions, and accommodations.    Consultations/Referrals:  None at this time.    Follow-up after Lumbar spine MRI, sooner if needed.

## 2023-12-06 NOTE — TELEPHONE ENCOUNTER
Pt called back requesting update on MRI - she states she still periodically has lower back pain - she is requesting medical advice for this while pending MRI appeal update

## 2023-12-07 ENCOUNTER — TREATMENT (OUTPATIENT)
Dept: PHYSICAL THERAPY | Facility: CLINIC | Age: 68
End: 2023-12-07
Payer: COMMERCIAL

## 2023-12-07 DIAGNOSIS — M76.52 PATELLAR TENDINITIS, LEFT KNEE: ICD-10-CM

## 2023-12-07 DIAGNOSIS — M25.562 PAIN IN LEFT KNEE: ICD-10-CM

## 2023-12-07 DIAGNOSIS — S83.412D SPRAIN OF MEDIAL COLLATERAL LIGAMENT OF LEFT KNEE, SUBSEQUENT ENCOUNTER: ICD-10-CM

## 2023-12-07 DIAGNOSIS — S83.412A SPRAIN OF MEDIAL COLLATERAL LIGAMENT OF LEFT KNEE, INITIAL ENCOUNTER: ICD-10-CM

## 2023-12-07 PROCEDURE — 97113 AQUATIC THERAPY/EXERCISES: CPT | Mod: GP

## 2023-12-07 ASSESSMENT — PAIN SCALES - GENERAL: PAINLEVEL_OUTOF10: 2

## 2023-12-07 ASSESSMENT — PAIN - FUNCTIONAL ASSESSMENT: PAIN_FUNCTIONAL_ASSESSMENT: 0-10

## 2023-12-07 NOTE — PROGRESS NOTES
"Physical Therapy Treatment    Patient Name: Marietta Avilez  MRN: [id number]  Encounter date:  12/7/2023  Time Calculation  Start Time: 1330  Stop Time: 1415  Time Calculation (min): 45 min    Visit Number:  15/20   Visit Authorized:  40/yr (18 used prior to this series)    Current Problem  1. Sprain of medial collateral ligament of left knee, initial encounter  Follow Up In Physical Therapy      2. Sprain of medial collateral ligament of left knee, subsequent encounter  Follow Up In Physical Therapy      3. Patellar tendinitis, left knee  Follow Up In Physical Therapy      4. Pain in left knee  Follow Up In Physical Therapy            Precautions  Precautions  Precautions Comment: none    Pain  Pain Assessment: 0-10  Pain Score: 2      Subjective  General  Response to Previous Treatment: Patient with no complaints from previous session., Compliant with home exercise program  Pt is still waiting for lumbar MRI despite continued pain and symptoms down legs. She had an x-ray and was concerned about her results, advised to discuss with Dr. Faulkner. Knee pain has been somewhat down without sharp increases.    Objective  No pain with stairs in/out of pool    Treatment:  Aquatic Therapy:   Aquatic Exercise Performed: Yes  Walk x3 laps fwd, bwd, s/s across pool    4' depth, cues for TrA brace, x20 ea with single UE on railing  Heel raise  Hip abduction  Hip extension  Hip flexion/SLR  Hamstring curl    Hamstring stretch at stairs 2x30\" ea  March across pool x3 laps with noodle support - tried without support however pt did not feel stable)    Deep, 5' with noodle under arms, BUE support:  Decompression x2'  Hip abd/adduction x2'  XC ski x2'   Bike x2'  Decompression x2'    Assessment:  Pt's response to treatment:  Pt with good pain relief in both knee and back in pool. Pt with no antalgia on pool deck and was able to perform stairs without pain.   Areas of improvements: Less pain with functional mobility  Limitations/deficits: " light support needed for balance in pool    Pain end of session:  0/10    Plan:  Continue with current POC/no changes    Assessment of current progress against goals:  Progressing toward functional goals      Goals:  Active       PT Problem       Goals (Progressing)       Start:  09/28/23    Expected End:  01/25/24       Pt will demonstrate 0-125 deg L knee ROM without pain for improved body mechanics during functional mobility.     Pt will increase strength in LLE by 1/2 MMT in all planes for improved performance of functional mobility.    Pt will ambulate long community distances across all surfaces including grass, inclines, and declines without pain or deviation.    Pt will ascend/descend 2 flights of stairs reciprocally without pain for improved performance of household and community mobility.    Pt will stand for up to one hour without pain and without feelings of imbalance.    Pt will complete 5x sit to  12s without pain for improved functional strength and mobility.

## 2023-12-12 ENCOUNTER — APPOINTMENT (OUTPATIENT)
Dept: PHYSICAL THERAPY | Facility: CLINIC | Age: 68
End: 2023-12-12
Payer: COMMERCIAL

## 2023-12-12 NOTE — PROGRESS NOTES
"Physical Therapy Treatment    Patient Name: Marietta Avilez  MRN: [id number]  Encounter date:  12/12/2023       Visit Number:  16/20   Visit Authorized:  40/yr (18 used prior to this series)    Current Problem  No diagnosis found.        Precautions       Pain         Subjective  General  ***    Objective  ***    Treatment:  Aquatic Therapy:      Walk x3 laps fwd, bwd, s/s across pool    4' depth, cues for TrA brace, x20 ea with single UE on railing  Heel raise  Hip abduction  Hip extension  Hip flexion/SLR  Hamstring curl    Hamstring stretch at stairs 2x30\" ea  March across pool x3 laps with noodle support - tried without support however pt did not feel stable)    Deep, 5' with noodle under arms, BUE support:  Decompression x2'  Hip abd/adduction x2'  XC ski x2'   Bike x2'  Decompression x2'    Assessment:  Pt's response to treatment: ***  Areas of improvements: ***  Limitations/deficits: ***    Pain end of session:  0/10    Plan:  Continue with current POC/no changes    Assessment of current progress against goals:  Progressing toward functional goals      Goals:              "

## 2023-12-15 ENCOUNTER — TREATMENT (OUTPATIENT)
Dept: PHYSICAL THERAPY | Facility: CLINIC | Age: 68
End: 2023-12-15
Payer: COMMERCIAL

## 2023-12-15 DIAGNOSIS — M25.562 PAIN IN LEFT KNEE: ICD-10-CM

## 2023-12-15 DIAGNOSIS — M76.52 PATELLAR TENDINITIS, LEFT KNEE: ICD-10-CM

## 2023-12-15 DIAGNOSIS — S83.412D SPRAIN OF MEDIAL COLLATERAL LIGAMENT OF LEFT KNEE, SUBSEQUENT ENCOUNTER: ICD-10-CM

## 2023-12-15 DIAGNOSIS — S83.412A SPRAIN OF MEDIAL COLLATERAL LIGAMENT OF LEFT KNEE, INITIAL ENCOUNTER: ICD-10-CM

## 2023-12-15 PROCEDURE — 97113 AQUATIC THERAPY/EXERCISES: CPT | Mod: GP

## 2023-12-15 ASSESSMENT — PAIN - FUNCTIONAL ASSESSMENT: PAIN_FUNCTIONAL_ASSESSMENT: 0-10

## 2023-12-15 ASSESSMENT — PAIN SCALES - GENERAL: PAINLEVEL_OUTOF10: 3

## 2023-12-15 NOTE — PROGRESS NOTES
"Physical Therapy Treatment    Patient Name: Marietta Avilez  MRN: [id number]  Encounter date:  12/15/2023  Time Calculation  Start Time: 1302  Stop Time: 1347  Time Calculation (min): 45 min     PT Therapeutic Procedures Time Entry  Aquatic Therapy Time Entry: 47    Visit Number:  16 / 20   Visit Authorized:  40/yr (18 used prior to this series)    Current Problem  1. Sprain of medial collateral ligament of left knee, initial encounter  Follow Up In Physical Therapy      2. Sprain of medial collateral ligament of left knee, subsequent encounter  Follow Up In Physical Therapy      3. Patellar tendinitis, left knee  Follow Up In Physical Therapy      4. Pain in left knee  Follow Up In Physical Therapy          Precautions  Precautions  Precautions Comment: none    Pain  Pain Assessment: 0-10  Pain Score: 3    Subjective  Pain was bad last weekend but better throughout the week. She is sore today but not overly so. MRI was approved, scheduled for next week.     Objective  No gait deviations on pool deck    Treatment:  Aquatic Therapy:   Aquatic Exercise Performed: Yes  Walk x3 laps fwd, bwd, s/s across pool    4' depth, cues for TrA brace, x20 ea with single UE on railing  Heel raise  Hip abduction  Hip extension  Hip flexion/SLR  Hamstring curl    Hamstring stretch at stairs 2x30\" ea  March across pool x3 laps with noodle support - tried without support however pt did not feel stable)    Deep, 5' with noodle under arms, BUE support:  Decompression x2'  Hip abd/adduction x2'  XC ski x2'   Bike x2'  Decompression x2'      Assessment:  Pt's response to treatment:  Despite pain increase pt with gait deviations on deck and in pool. Pt with good relief in water, including decrease in back pain.   Areas of improvements:  Better gait pattern  Limitations/deficits:  intermittent sharp pain at home    Pain end of session:  0/10    Plan:  Continue with current POC/no changes    Assessment of current progress against " goals:  Progressing toward functional goals    Goals:  Active       PT Problem       Goals (Progressing)       Start:  09/28/23    Expected End:  01/25/24       Pt will demonstrate 0-125 deg L knee ROM without pain for improved body mechanics during functional mobility.     Pt will increase strength in LLE by 1/2 MMT in all planes for improved performance of functional mobility.    Pt will ambulate long community distances across all surfaces including grass, inclines, and declines without pain or deviation.    Pt will ascend/descend 2 flights of stairs reciprocally without pain for improved performance of household and community mobility.    Pt will stand for up to one hour without pain and without feelings of imbalance.    Pt will complete 5x sit to  12s without pain for improved functional strength and mobility.

## 2023-12-19 ENCOUNTER — APPOINTMENT (OUTPATIENT)
Dept: PHYSICAL THERAPY | Facility: CLINIC | Age: 68
End: 2023-12-19
Payer: COMMERCIAL

## 2023-12-19 NOTE — PROGRESS NOTES
"Physical Therapy Treatment    Patient Name: Marietta Avilez  MRN: [id number]  Encounter date:  12/19/2023             Visit Number:  17/20   Visit Authorized:  40/yr (18 used prior to this series)    Current Problem  No diagnosis found.      Precautions       Pain       Subjective  ***    Objective  ***    Treatment:  Aquatic Therapy:      Walk x3 laps fwd, bwd, s/s across pool    4' depth, cues for TrA brace, x20 ea with single UE on railing  Heel raise  Hip abduction  Hip extension  Hip flexion/SLR  Hamstring curl    Hamstring stretch at stairs 2x30\" ea  March across pool x3 laps with noodle support - tried without support however pt did not feel stable)    Deep, 5' with noodle under arms, BUE support:  Decompression x2'  Hip abd/adduction x2'  XC ski x2'   Bike x2'  Decompression x2'      Assessment:  Pt's response to treatment: ***  Areas of improvements: ***  Limitations/deficits: ***    Pain end of session:  0/10    Plan:  Continue with current POC/no changes    Assessment of current progress against goals:  Progressing toward functional goals    Goals:                "

## 2023-12-20 ENCOUNTER — ANCILLARY PROCEDURE (OUTPATIENT)
Dept: RADIOLOGY | Facility: CLINIC | Age: 68
End: 2023-12-20
Payer: MEDICARE

## 2023-12-20 DIAGNOSIS — M46.46 DISCITIS OF LUMBAR REGION: ICD-10-CM

## 2023-12-20 DIAGNOSIS — S33.9XXA SPRAIN AND STRAIN OF LUMBOSACRAL JOINT/LIGAMENT, INITIAL ENCOUNTER: ICD-10-CM

## 2023-12-20 DIAGNOSIS — M54.16 LUMBAR BACK PAIN WITH RADICULOPATHY AFFECTING LOWER EXTREMITY: ICD-10-CM

## 2023-12-20 DIAGNOSIS — M51.36 DDD (DEGENERATIVE DISC DISEASE), LUMBAR: ICD-10-CM

## 2023-12-20 DIAGNOSIS — M47.816 LUMBAR SPONDYLOSIS: ICD-10-CM

## 2023-12-20 PROCEDURE — 72148 MRI LUMBAR SPINE W/O DYE: CPT

## 2023-12-22 ENCOUNTER — TREATMENT (OUTPATIENT)
Dept: PHYSICAL THERAPY | Facility: CLINIC | Age: 68
End: 2023-12-22
Payer: COMMERCIAL

## 2023-12-22 DIAGNOSIS — S83.412A SPRAIN OF MEDIAL COLLATERAL LIGAMENT OF LEFT KNEE, INITIAL ENCOUNTER: ICD-10-CM

## 2023-12-22 DIAGNOSIS — M25.562 PAIN IN LEFT KNEE: ICD-10-CM

## 2023-12-22 DIAGNOSIS — S83.412D SPRAIN OF MEDIAL COLLATERAL LIGAMENT OF LEFT KNEE, SUBSEQUENT ENCOUNTER: ICD-10-CM

## 2023-12-22 DIAGNOSIS — M76.52 PATELLAR TENDINITIS, LEFT KNEE: ICD-10-CM

## 2023-12-22 PROCEDURE — 97113 AQUATIC THERAPY/EXERCISES: CPT | Mod: GP

## 2023-12-22 ASSESSMENT — PAIN SCALES - GENERAL: PAINLEVEL_OUTOF10: 2

## 2023-12-22 ASSESSMENT — PAIN - FUNCTIONAL ASSESSMENT: PAIN_FUNCTIONAL_ASSESSMENT: 0-10

## 2023-12-22 NOTE — PROGRESS NOTES
"Physical Therapy Treatment    Patient Name: Marietta Avilez  MRN: [id number]  Encounter date:  12/22/2023  Time Calculation  Start Time: 1301  Stop Time: 1346  Time Calculation (min): 45 min     PT Therapeutic Procedures Time Entry  Aquatic Therapy Time Entry: 45    Visit Number:  17 (including evaluation)  Planned total visits: 20  Visit Authorized:  40/yr (18 used prior to this series)     Current Problem  1. Sprain of medial collateral ligament of left knee, initial encounter  Follow Up In Physical Therapy      2. Sprain of medial collateral ligament of left knee, subsequent encounter  Follow Up In Physical Therapy      3. Patellar tendinitis, left knee  Follow Up In Physical Therapy      4. Pain in left knee  Follow Up In Physical Therapy            Precautions  Precautions  Precautions Comment: none    Pain  Pain Assessment: 0-10  Pain Score: 2    Subjective  Pt had lumbar MRI, read results on [employer] before seeing her doctor and is very concerned about what she read. Knee and back symptoms have been about the same.     Objective  Mild pain with L hip extension    Treatment:  Aquatic Therapy:   Aquatic Exercise Performed: Yes  Walk x3 laps fwd, bwd, s/s across pool    4' depth, cues for TrA brace, x20 ea with single UE on railing  Heel raise  Hip abduction  Hip extension  Hip flexion/SLR  Hamstring curl    Hamstring stretch at stairs 2x30\" ea  March across pool x3 laps with noodle support - tried without support however pt did not feel stable)    Deep, 5' with noodle under arms, BUE support:  Decompression x2'  Hip abd/adduction x2'  XC ski x2'   Bike x2'  Decompression x2'      Assessment:  Pt's response to treatment: Very generally discussed MRI results to decrease anxiety related to radiologist interpretation, and explained concept of stenosis and nerve impingement. Pt receptive to education and had better understanding following. Pain down with aquatics.   Pain end of session:  0/10    Plan:  Continue with " current POC/no changes    Assessment of current progress against goals:  Progressing toward functional goals    Goals:  Active       PT Problem       Goals (Progressing)       Start:  09/28/23    Expected End:  01/25/24       Pt will demonstrate 0-125 deg L knee ROM without pain for improved body mechanics during functional mobility.     Pt will increase strength in LLE by 1/2 MMT in all planes for improved performance of functional mobility.    Pt will ambulate long community distances across all surfaces including grass, inclines, and declines without pain or deviation.    Pt will ascend/descend 2 flights of stairs reciprocally without pain for improved performance of household and community mobility.    Pt will stand for up to one hour without pain and without feelings of imbalance.    Pt will complete 5x sit to  12s without pain for improved functional strength and mobility.

## 2023-12-27 NOTE — PROGRESS NOTES
Verbal consent of the patient and/or verbal parental consent for patients under the age of 18 have been obtained to conduct a physical examination at this office visit.    Established patient  History Of Present Illness  01/04/24 Marietta Avilez is a 68 y.o. female who presents for follow up of her LEFT knee and to review her LUMBAR spine MRI. Patient complains numbness to anterior LEFT leg as well as pain in lumbar spine which radiates down her RIGHT leg. She states it is worse with cold weather, stairs, prolonged sitting, and lying on RIGHT side. She is not treating her pain with OTC medications, ice or heat, as repeatedly recommended. She is wearing good supportive footwear and states she has purchased inserts but not yet placed them into her shoes. She has been in physical therapy for a prolonged period of time and reports doing her Home Exercise Program. She additionally states that she feels her balance is not well. At the last visit, the office stressed the importance of taking medicine and advised supplements. Additionally, she states she is unable to take pills due to the taste. Again we stressed the importance of her to take her medicine through a pill   or crushing up her medicine to help improvement of pain and symptoms.   I additionally told her were going to refer her to physical medicine and rehab and pain management for possibility of epidural injections for her lumbar spine especially since she refuses to try to take any medication because of the taste even after recommending  she mixes it with pudding or Jell-O or put it in a drink.  I also told her because of the epidural lipomatosis seen on the MRI of multiple levels causing impingement of her nerves at different levels that I would also recommend she gets into see neurosurgery for possible surgical evaluation especially if symptoms get worse.  She is to continue her exercises learned in physical therapy and she does not want to go back to  "therapy because she feels everything that she learned there she can do on her own.      Last Recorded Vitals  /74   Pulse 78   Ht 1.626 m (5' 4\")   Wt 80.7 kg (178 lb)   BMI 30.55 kg/m²      All previous Progress Notes and imaging results related to this patients chief complaint have been reviewed in preparation for this examination.    Past Medical History  She has a past medical history of History of back surgery.    Surgical History  She has no past surgical history on file.     Social History  She reports that she has never smoked. She has never used smokeless tobacco. She reports that she does not drink alcohol and does not use drugs.    Family History  No family history on file.     Allergies  Patient has no known allergies.    Historical Clinical Intake  February 21, 2023-------ROMULO  Verbal consent of the patient and/or verbal parental consent for patients under the age of 18 have been obtained to conduct a physical examination at this office visit. Newly established patient. Marietta, a 67 year old, visited the clinical today for her LEFT knee. Marietta brought her  to her appointment today. Marietta states she was in [place] and was walking on an uneven sidewalk and tripped and fell onto her LEFT knee in a flexed position on February 6, 2023. She states this is the first time she has been seen for this injury, therefore she did not have previous x-rays. Marietta states she has no previous medical history of any knee injuries. Marietta rated her pain as a 0/10 today stating she experiences discomfort, not necessarily pain. She was tender to palpate on the superior pole of the patella where she had an abrasion from the fall. Marietta states initially her LEFT knee was bleeding. Marietta stated when she fell it was a 10/10 pain. Marietta denies any pops, snaps or cracks as well as any numbness/tingling or pins and needles. She did note sometime last week she felt a \"pinched nerve\" while walking. She denies trying any " "current treatments for pain relief. She experienced no pain with knee flexion/extension.  -----------------------------  May 10, 2023 Above note reviewed. Verbal consent of the patient and/or verbal parental consent for patients under the age of 18 have been obtained to conduct a physical examination at this office visit. Marietta returns today for follow up of her LEFT knee. She states that she has good and bad days. Rates current pain as a 3/10 describing it as a throbbing pain. She has been referred in by her physical therapist due to a popping that was felt Friday during her physical therapy appointment. Pt also notes that she has been experiencing \"crackling\" feeling as well in her knee. We discussed treatment options. Patient has had several weeks of physical therapy and was making progress but has had a sudden relapse with significant increase in pain. This is concerning for more significant disease the previously considered as such we will order an MRI for further evaluation of her knee with consideration of meniscus versus MCL injury. Patient verbalizes understanding and agreement with plan of care.  -----------------------------  June 7, 2023 Above notes reviewed. Verbal consent of the patient and/or verbal parental consent for patients under the age of 18 have been obtained to conduct a physical examination at this office visit. Marietta returns today for follow up of her MRI of her LEFT knee. Pt states that she feels the same since her previous visit. Rates current pain as a 2-3/10 noting that her pain is always there. Pt completed physical therapy as of Friday last week. We reviewed patient MRI results in detail. Patient verbalizes understanding of results. We discussed treatment options and will fit patient for OA unloading brace with medial pressure and will have patient follow-up with Dr. Faulkner to discuss therapeutic injections. Patient will continue with physical therapy or home exercise plan and can " take over-the-counter medications for pain. Patient verbalizes understanding and agreement with plan of care.  -----------------------------  July 20, 2023 Above notes reviewed. Verbal consent of the patient and/or verbal parental consent for patients under the age of 18 have been obtained to conduct a physical examination at this office visit. Marietta, a newly established patient, returns today for Prolozone #1 injection into the LEFT knee. She is c/o numbness in her LEFT leg from the anterior aspect of her knee radiating down to her foot. Denies any locking/catching or instability in her LEFT Knee. Pain is currently 3/10. She says she still having a lot of pain in her knee at times however additionally she feels that other times it is radiating down into her foot and she describes it as a numbness and tingling. She also says going up and down steps hurts more than normal. I told her most likely she is dealing with 2 different things some arthritic changes in her knee as well as her meniscus injuries as well as additionally something going on from her lumbar spine radiating down her leg and into her foot. She is going to schedule an appointment for us to workup her lumbar spine in the future. Also she says she has never heard from the bracing representative yet for her custom OA unloading knee brace which I told her we would look into for her. We also talked in detail about different supplements she can take to help with the arthritis however she says she cannot take pills at all however I talked to her into looking into liquid forms of glucosamine chondroitin with hyaluronic acid and if not I told her she can grind up the pills and put them in applesauce.  -----------------------------  July 26,2023 Above notes reviewed. Verbal consent of the patient and/or verbal parental consent for patients under the age of 18 have been obtained to conduct a physical examination at this office visit. Marietta, a newly established  patient, returns today for Euflexxa#1. Marietta is currently 3/10 pain to Left knee described as aching primarily to the medial anterior and posterior knee. She confirms she occasionally has tingling and numbness to the LEFT leg. She is unable to swallow pills so she doesn't take anything for pain, but she does apply ice and heat to the area. She is in the process of scheduling her physical therapy.  -----------------------------  August 3, 2023 All notes reviewed. Verbal consent of the patient and/or verbal parental consent for patients under the age of 18 have been obtained to conduct a physical examination at this office visit. Marietta is a recently established patient who presents for Prolozone #2 injection into her LEFT knee. she says that she was a little sore after her last injection but overall she is doing okay. She says she noticed a difference in her range of motion and she is definitely not as painful.  -----------------------------  August 31, 2023 All notes reviewed. Verbal consent of the patient and/or verbal parental consent for patients under the age of 18 have been obtained to conduct a physical examination at this office visit. Marietta is a recently established patient who is here today for her Euflexxa #2 injection into her LEFT knee. Marietta states that her LEFT knee is doing considerably better since she started the viscosupplementation injections and regenerative injections in conjunction with physical therapy. She is now walking without a limp and denies any swelling, locking, catching, instability in her LEFT knee. She is unable to swallow pills and therefore is only using moist heat as needed for pain management. She rates LEFT knee pain currently at 2/10.  -----------------------------  [place] 7, 2023 Above notes reviewed. Verbal consent of the patient and/or verbal parental consent for patients under the age of 18 have been obtained to conduct a physical examination at this office visit. Marietta  is an established patient who presents for Prolozone #3 injection into her LEFT knee. She attends Physical Therapy at [facility] with aquatics, which she notes is most helpful. She reports diffuse swelling throughout her left knee over this past week. She notes recent weather fluctuations and cooler temperatures have caused her pain to increase to a reported 3/10 today. She is not presently utilizing any pain management interventions.  -----------------------------  September 14, 2023 Above notes reviewed. Verbal consent of the patient and/or verbal parental consent for patients under the age of 18 have been obtained to conduct a physical examination at this office visit. Marietta, an established patient, who is here today for Euflexxa #3 injection into her LEFT knee. She states that she continues to have pain that comes and goes. She continues to c/o pain climbing steps. She denies any instability, locking or catching in her left knee. Pain is currently 3/10. She states she is not ready to do the injection today because she thought it was only Prolozone today and she would like to wait to do injection until next week so that she is mentally prepared for it. Additionally, Marietta presents with an area of skin irritation on the anterior aspect of her LEFT knee that appeared 1-2 days after her last Prolozone injection. The lesion appears to be a reaction to the cold spray. There is mild increased redness around the area, but no drainage, odor, or warmth.  -----------------------------  September 27, 2023 Above notes reviewed. Verbal consent of the patient and/or verbal parental consent for patients under the age of 18 have been obtained to conduct a physical examination at this office visit. Marietta is an established patient who is present today to recieve Euflexxa #3 injections into her LEFT knee. She states that she continues to have on/off pain, today she is experiencing more pain. Rates current pain as a 3/10. She continues  "with physical therapy and has two sessions left. Pt notes that the aquatic therapy has been greatly helping her she notes. Overall she is very happy with the results of the injections not only with her range of motion but also her strength. She says definitely if she continues to get improvement she would like to do these injections again in the future instead of even thinking about any surgical intervention.  ---------------------------------  12/06/23 Marietta Avilez is a 68 y.o. female who presents for re-evaluation of her LEFT knee and evaluation of her Lumbar Spine.  She is 3 months s/p regenerative injections and Euflexxa injections into her LEFT knee. Marietta did get mild relief of LEFT Knee pain s/p injections; however, she states that her LEFT knee pain is getting worse recently around her LEFT patella as well as along the medial joint line. She has some swelling in her LEFT knee, but denies any instability in the LEFT knee. She states that she has \"nerve\" pain along the anterior aspect of her LEFT Knee.  She has been working in physical therapy nonstop since in August not only for her knees but also her lumbar spine and is not getting any better.  Physical therapy has sent her back in for further evaluation and workup because they feel they cannot do anything else for her lumbar spine or needs to really figure out what else is going on so they want her further evaluated and worked up with further testing preferably MRI of the lumbar spine. Additionally, She does feel like her balance is off due to her knees and feels like it is more due to her back pain radiating down into her legs. She has c/o bilateral radiculopathy LEFT > RIGHT that has not improved despite physical therapy and OTC Liquid NSAIDS and Tylenol has not helped. She is unable to take any oral medication as she states she can't swallow pills and therefore, can not take higher doses of medication or the supplements that were recommended " previously.    Marietta is currently still in aquatic therapy for her Left knee and low back and continues to perform her home exercise program. She has been attending physical therapy including aquatic therapy continually since August 2023 with no improvement and is currently c/o shooting pain down her LEFT leg as well as numbness and tingling.  Her pain is 2/10 currently, but is exacerbated with prolonged walking, standing, stair climbing, bending and squatting. She often feels off balance/unsteady due to her low back pain.     Review of Systems:  CONSTITUTIONAL:   Negative for weight change, loss of appetite, fatigue, weakness, fever, chills, night sweats, headaches .           HEENT:   Negative for cold, cough, sore throat, sinus pain, swollen lymph nodes.           OPHTHALMOLOGY:   Negative for diminished vision, blurred vision, loss of vision, double vision.           ALLERGY:   Negative for runny nose, scratchy throat, sinus congestion, rash, facial pressure, nasal congestion, post-nasal drip.           CARDIOLOGY:   Negative for chest pain, palpitations, murmurs, irregular heart beat, shortness of breath, leg edema, dyspnea on exertion, fatigue, dizziness.           RESPIRATORY:   Negative for chest pain, shortness of breath, swelling of the legs, asthma/copd, chest congestion, pain with breathing .           GASTROENTEROLOGY:   Negative for nausea, vomitting, heartburn, constipation, diarrhea, blood in stool, change in bowel habits, black stool.           HEMATOLOGY/LYMPH:   Negative for fatigue, loss of appetitie, easy bruising, easy bleeding, anemia, abnormal bleeding, slow healing.           ENDOCRINOLOGY:   Negative for polyuria, polydipsia, polyphagia, fatigue, weight loss, weight gain, cold intolerance, heat intolerance, diabetes.           MUSCULOSKELETAL:   Positive  for Left  knee pain and Lumbar Spine pain        DERMATOLOGY:   Negative for rash, bruising.           NEUROLOGY:   Negative for  gait  abnormality, paresthesias, weakness, sciatica.      Positive for Bilateral lower extremity tingling, numbness, and radiculopathy LEFT > RIGHT     General (KNEE):   All findings improved since injections and physical therapy  Location:  Left>RIGHT .   Erythema: Negative:,  Edema:  Positive:  Effusion: Negative.   Warmth: Negative.   Percussion Test: Negative.   Tuning Fork Test: Negative.   Ecchymosis/Bruising: Negative.   Abrasions: Negative;.     Palpation: Positive:, Tender to palpation over the patella, Media and Lateral Joint Line, MCL  improved  Baker's Cyst: Negative.   Orientation: Symmetrical.   Range of Motion:  full range of motion now without pain  Strength:  Positive:   +4-+5/+5, Quadricep Extension  +5/+5, Hamstring Flexion     +4-+5/+5 Hip Flexion  +5/+5 Hip Extension     +3-+4/+5 Hip ABduction away from body  +4-+5/+5 Hip ADduction towards body     +5/+5 Hip Internal Rotation  +5/+5 Hip External Rotation      +5/+5 IT-Band      +5/+5 Gastrocnemius  +5/+5 Soleus.      DTR/Neurological:   +2/+4 Patellar (L4)  +2/+4 Posterior Tibialis Reflex and Medial Hamstrings (L5)  +2/+4 Achilles (S1).         Sensation/Neurological Lumbar:  Relatively unchanged since physical therapy and injections   Positive: Sensation decreased  L1: Low back, hips, and groin  L2: Low back and front of inside of upper leg/thigh  L3: Low back and front of upper leg/thigh  L4: Low back, front of upper leg/thigh, front of lower leg/calf, front of medial area of knee, and inside of ankle Decreased LEFT   L5: Low back, front and lateral knee, front and outside of lower leg/calf, top and bottom of foot and first four toes especially big toe.  Decreased LEFT         Sensation/Neurological Sacrum:  Relatively unchanged since physical therapy and injections  Positive: Sensation decreased   Positive: S1: low back, back of upper leg/thigh, back and inside of lower leg, calf and little toe Decreased LEFT   S2: Buttocks, genitals, back of upper  leg/thigh and calves  S3: Buttocks and genitals  S4: Buttocks  S5: Buttocks.   Pain with Squat: Positive   Pain with Sumo Squat: Positive:   Vascular:   +2/+4 Femoral, +2/+4 Dorsalis Pedis, +2/+4 Posterior Tibial  Capillary Refill less than 2 seconds.   Feet/Foot:  Valgus Foot Deformity (Pes Planus) Bilateral.         Knee - ACL: All findings improved since injections and physical therapy    Anterior Drawer Test: Negative.   Lachman Test: Negative.   Prone Lachman Test: Negative.   Lelli Test: Negative.   Pivot Shift Test: Negative.   Crossover Test: Negative.      KNEE - FAT PAD: All findings improved since injections and physical therapy    Squeeze Test Knee Bent going into extension:  negative     KNEE - IT BAND:  Julio Cesar Test: Negative.   Noble Test: Negative.      KNEE - MCL / LCL: All findings improved since injections and physical therapy      Valgus Stress Test:   negative, 20-30 degrees  Varus Stress Test:  90 degrees: Negative, 20-30 degrees: Negative.    Apley Distraction Test:  Negative.   Thessaly Test:  Positive    KNEE - MENISCUS:All findings improved since injections and physical therapy    Apley Compression Test:  Negative.   Duck Walk Test:  Positive    Stienmann 1 Test:  Positive    Stienmann 2 Test:  Negative.   Everardo Test:  Positive  Bragard's Test:  Negative.   Bounce Home Test:   negative  Payr Test:  Negative.   Thessaly Test:  Positive  Kyaw's Test:  Negative.      KNEE - PATELLA:All findings improved since injections and physical therapy    Apprehension Test: Negative.   Glide Test: Positive:.   Facet Tenderness Test: Negative.   Medial Patellar Plica Test: Negative.   Grind Test: Negative.   Patella Tracking Test: Negative.   Medial/Lateral Subluxation Test: Negative.   Suppression Test: Negative.      Knee - PCL/POSTERIOR LATERAL CORNER:  Posterior Drawer Test: Negative.   Hammer 90/90 (Sag Sign) Test: Negative.   Dial Test: Negative.   Reverse Lachman Test: Negative.   Quad Activation  Test: Negative.   ER / Recurvatum Test: Negative.   Reverse Pivot Shift Test: Negative.      KNEE - POPLITEUS:  Jonathan's Test: Negative.      KNEE - QUADRICEPS:  Dreyer Test: Negative.   VMO Test: Negative.    VLO Test: Negative.      Hip/Pelvis - Sacrum:  Leg Length Supine:  Negative.   Leg Length Supine to Seated (Derbolowsky Sign):  Negative.   Standing Flexion Test:  Negative.   Seated Flexion Test:  Negative.   Spring Test:  Negative.   Sacral Somatic Dysfunction:  Negative.   Sacroiliac (SI) Joint Fixation Test:  Negative.   Hip Flexor Tightness:  Negative.   Hamstring Tightness:  Negative.         Examination:  Relatively unchanged since physical therapy and injections   Lumbar Spine  LEFT with Radiculitis     Edema: Negative.   TART Findings: Positive  Tissue Texture Changes,Assymmetry,Restriction,Tenderness paraspinal muscles lower lumbar spine.   Ecchymosis/Bruising: Negative.   Percussion Test (LUMBAR): Negative.   Tuning Fork Test (LUMBAR): Negative.   Percussion (Sacrum): Negative.   Tuning Fork (Sacrum): Negative.      Orientation: Relatively unchanged since physical therapy and injections  Orientation (LUMBAR): Positive  Decreased Lumbar Lordosis due to muscle spasms.   Orientation (Sacrum):  Positive  Decreased Sacral Flexion/Extension.      ROM (LUMBAR): Relatively unchanged since physical therapy and injections  Positive  Decreased due to pain, Forward Flexion, Extension, Lateral Bending (Side Bending), and Twisting (Rotation).      Muscle Strength:   5+/5 Hamstring Flexion  +4/+5 Quadricep Extension  +4+5 Hip Flexion  +5/+5 Hip Extension  +3/+5 Hip ABduction toward body  +4+5 Hip ADduction away from body               +5/+5 Hip Internal Rotation at 90 Degrees  +5/+5 Hip External Rotation at 90 Degrees  +5/+5 Hip Internal Rotation at 0 Degrees  +5/+5 Hip External Rotation at 0 Degrees.            DTR/Neurological:    +2/+4 Patellar Reflex (L-4)  +2/+4 Posterior Tibialis and Medial Hamstrings  Reflex (L5)  +2/+4 Achilles Reflex (S-1).      Sensation/Neurological Lumbar:   Relatively unchanged since physical therapy and injections  Positive  Decreased LEFT       Negative L1: Low back, hips, and groin  Negative L2: Low back and front of inside of upper leg/thigh  Negative L3: Low back and front of upper leg/thigh  Positive L4: Low back, front of upper leg/thigh, front of lower leg/calf, front of medial area of knee, and inside of ankle  Decreased LEFT      Positive L5: Low back, front and lateral knee, front and outside of lower leg/calf, top and bottom of foot and first four toes especially big toe. Decreased LEFT              Sensation/Neurological Sacrum:     Positive   Decreased LEFT   Positive S1: low back, back of upper leg/thigh, back and inside of lower leg, calf and little toe  Decreased LEFT   Negative S2: Buttocks, genitals, back of upper leg/thigh and calves  Negative S3: Buttocks and genitals  Negative S4: Buttocks  Negative S5: Buttocks.              Sensation/Neurological Coccygeal:    Negative  Sensation Intact, 2-Point Discrimination: Negative   Negative Coccyx: Buttocks and area of tailbone.            Palpation:  Negative Tenderness to Palpation.            Vascular:   Capillary Refill < 2 seconds  +2/+4Carotid  +2/+4 Dorsalis Pedis  +2/+4 Posterior Tibial.             Low Back-Disc Injury:  Relatively unchanged since physical therapy and injections  Valsalva Maneuver: Negative.   Lhermitte's Sign: Negative.   Fermoral Nerve Traction Test: Negative.   Slump Test: Positive: Left > Right   Cross Test Seated: Negative.   Laseague Sign:  Positive: Left > Right    Laseague Differential Test: Negative.   Laseague Drop Test:  Positive: Left > Right   Seated Laseague Test:  Positive: Left > Right   Reverse Laseague Test: Negative.   Bonnet Piriformis Test: Negative.   Bragard Test: Negative.   Duchenne Trendelenberg Test: Negative.   Kernig-Brudzinski Test: Negative.   Tip Toe Heel Walking Test:  Negative.   Mirna Prone Knee Flexion Test: Negative.              Low Back-Hip:  Corby/MIGUEL Test: Negative.   FADIR Test: Negative.   Iliolumbar Ligament Test: Negative.   Sacrospinous and SI Ligament Test: Negative.   Sacrotuberal Ligament Test: Negative.   Psoas Sign: Negative.          Low Back-Sciatica:  Bundy Test: Negative.          Low Back-SI Joint:  Three-Phase Hyperextension Test: Negative.   Spine Test: Negative.   Yeoman Test: Negative.   Chaitanya Test: Negative.   Sacroilliac Stress Test: Negative.   Abduction Stress Test: Negative.          Low Back-Spondy: Relatively unchanged since physical therapy and injections  Stork Test: Negative.   Sphinx Test: Negative.   Modified Sphinx Test:  Positive: Left > Right        Hip/Pelvis - Sacrum: Relatively unchanged since physical therapy and injections  Standing Flexion Test: Positive  Left   Seated Flexion Test: Positive  Left   Spring Test: Negative   Sacral Somatic Dysfunction: Positive RrRa: Right  rotation Right axis  Hip Flexor Tightness: Positive Right >  Left   Hamstring Tightness: Positive Left > Right       Leg Length:  Leg Length Supine: Positive LEFT leg shorter than the Right , during Physical Examination, and Will verify with standing erect pelvis xray   Leg Length Supine to Seated (Derbolowsky Sign): Positive LEFT leg shorter than the Right , during Physical Examination, and Will verify with standing erect pelvis xray   All findings remain unchanged since last visit    Feet/Foot:   Positive   Valgus foot BILATERAL          Imaging and Diagnostics Review:  STUDY:  MR LUMBAR SPINE WO IV CONTRAST; 12/20/2023 3:51 pm      INDICATION:  Signs/Symptoms:Low back pain with radiculopathy, discitis, DDD,  spondylosis, Possibility of herniated or bulging disc vs ligamentous  injury vs stenosis vs fracture. [facility] radiologist to read please..      COMPARISON:  X-ray lumbar spine 12/06/2023      ACCESSION NUMBER(S):  [id number]      ORDERING  CLINICIAN:  RUBIN GOLDSMITH      TECHNIQUE:  Multiple, multiplanar sequences of the lumbar spine were obtained.      FINDINGS:  The normal lumbar lordosis is preserved. The conus terminates at  L1-L2. No acute compression deformity is seen. However, there is a  suggestion of chronic bilateral pars defects noted at L3. There is  grade 1 anterolisthesis of L3 on L4. Degenerative endplate changes  are seen at L4-L5. No significant STIR abnormalities are seen within  the marrow.      The vertebral bodies are grossly preserved.      L1-L2: No disc herniation. No significant canal or foraminal stenosis.  L2-L3: Broad-based disc protrusion, ligamentum flavum hypertrophy and facet arthropathy and epidural lipomatosis causing mild canal stenosis and mild bilateral foraminal stenosis.  L3-L4: Diffuse disc bulge with superimposed central disc extrusion, ligamentum flavum hypertrophy and facet arthropathy and epidural lipomatosis causing severe canal stenosis and mild bilateral foraminal stenosis. Mass-effect on the nerve roots is suggested.   L4-L5: Diffuse disc bulge superimposed tiny central disc protrusion with evidence of annular fissure causing no significant canal stenosis but moderate bilateral foraminal stenosis.   L5-S1: Diffuse disc bulge and ligamentum flavum hypertrophy facet arthropathy causing moderate  bilateral foraminal stenosis but no significant canal stenosis.      Incidental note is made of hypointense T2 signal seen diffusely within the liver.      IMPRESSION:  1. Chronic bilateral pars defects at L3 with associated     2. grade 1 anterolisthesis of L3 on L4. This can be confirmed on CT lumbar spine  if clinically warranted. No surrounding reactive marrow edema seen.    3. Degenerative disc disease and spondylosis L2-S1 most severe at L3-L4.    4.  L2-L3 and L3-L4  epidural lipomatosis Mass-effect upon the nerve roots at this level is suggested.    5. MR findings which may be compatible with liver  hemochromatosis.  Correlate with iron studies. If there is further concern, MRI liver  iron quantification may be obtained. Follow up with PCP and Endocrinologist    6. L2-L3 disc protrusion, L4-L5 disc protrusion    7. L3-L4 disc bulge, L4-L5 disc bulge, L5-S1 disc bulge    8. L3-L4 epidural lipomatosis causing severe spinal canal stenosis and mild BILATERAL neural foraminal stenosis and nerve impingement of L3-L4    9. Facet hypertrophy multiple levels gilles. L5-S1    10. Disc extrusion L3-L4 causing impingement as well    MACRO:  None    -------------------------------------------------------------  Left leg shorter than right leg by the following:  Iliac crest:  3.6 mm  Sacral base:  0 mm  Midline femoral heads:  6.8 mm  Median difference of the left leg being shorter than right leg is approximately:  5.2 mm     STUDY:  XR PELVIS 1-2 VIEWS; 12/6/2023 12:08 pm      INDICATION:  Signs/Symptoms:LBP, standing erect pelvis XR without shoes to assess  leg length..      COMPARISON:  None      ACCESSION NUMBER(S):  [id number]      ORDERING CLINICIAN:  RUBIN GOLDSMITH      TECHNIQUE:  AP view of pelvis      FINDINGS:  Bones: The bones appear intact.  Joints: Degenerative changes lower lumbar spine. Bilateral SI and hip  joints are unremarkable. Soft Tissue: Postop subcutaneous emphysema.      IMPRESSION:  Unremarkable pelvis    --------------------------------------------------------------  STUDY:  Lumbar Spine, 4 views.      INDICATION:  Signs/Symptoms:low back pain with radiculopathy.      COMPARISON:  None available.      ACCESSION NUMBER(S):  [id number]      ORDERING CLINICIAN:  RUBIN GOLDSMITH      FINDINGS:  3 mm anterolisthesis of L3.  Severe narrowing of L4-L5 intervertebral disc with endplate sclerosis  and spurring. No evidence of spondylolysis. Bilateral facet  arthropathy evident in lower lumbar spine, worse on the right. SI  joints are unremarkable. Vertebral body heights are preserved.  Posterior elements are  intact.      IMPRESSION:  Advanced degenerative disc disease L4-L5.  Bilateral facet arthropathy lower lumbar spine.      MACRO:  None.      ---------------------------------------------------------------    PROCEDURE: KNEE LT WO - TMR 2204  REASON FOR EXAM: SPRAIN OF OTHER SPECIFIED PARTS OF LEFT KNEE, SUBS ENCNTR   RESULT: MRN: [id number]     Patient Name: SHAKILA ESQUEDA  STUDY:KNEE LT WO 5/31/2023 6:02 pm  INDICATION:SPRAIN OF OTHER SPECIFIED PARTS OF LEFT KNEE, SUBS ENCNTR pain fall anterior knee  COMPARISON:02/21/2023     ACCESSION NUMBER(S):[id number]   ORDERING CLINICIAN:YELENA MENENDEZ     STUDY:Multiplanar and multisequence MR images were obtained of the left knee.      FINDINGS:  Anterior cruciate ligament is intact.        Posterior cruciate ligament is intact.        Lateral meniscus demonstrates a free edge tear of the posterior horn of the lateral meniscus about the body junction (series 7, image 5 and series 9, image 17).         Medial meniscus demonstrates a blunted free edge of the posterior horn of the medial meniscus component which is likely on a degenerative basis.          Correlate with patient's history including partial meniscectomy. The body of the medial meniscus is unremarkable with mild meniscal degeneration.          No longitudinal tear       Medial collateral ligament complex is unremarkable         Lateral supporting structures are intact. There is no edema in the posterolateral corner         Extensor mechanism is intact. Mild patellar tendinosis demonstrated.           Patellofemoral articulation demonstrates mild thinning of the articular cartilage particularly within the lateral facet of the patella inferiorly. Trochlea demonstrates mild thinning of the cartilage along the outer margin medial trochlea. No cartilage defect      Lateral femorotibial compartment demonstrates mild thinning of the cartilage along the outer margin weight-bearing portion of the compartment. Medial  femorotibial compartment demonstrates denuded articular cartilage within the weight-bearing portion of the compartment.           There is osteophytosis along the outer margin of the compartment. Minimal subchondral intermediate signal intensity. Also, component of severe cartilage thinning along the posterior nonweightbearing femoral condyle           There is no knee joint effusion. No intra-articular bodies demonstrated.            Subtle popliteal cyst demonstrated.       IMPRESSION:  1. Medial femorotibial compartment osteoarthritis with severe loss of articular cartilage within the weight-bearing portion of the compartment.  2. Free edge tear of the posterior horn lateral meniscus about the body junction.  3. Blunted free edge free edge irregularity of the posterior horn of the medial meniscus on a degenerative basis. Mild peripheral extrusion of the body medial meniscus.            Dictation workstation: [id number]   Original Interpreting Physician: ANGELA NUGENT MD  Original Transcribed by/Date: MMODAL May 31 2023 4:55P  -----------------------------------------------------  PROCEDURE: KNEE LT MIN 4 VIEW - TXR 0182  REASON FOR EXAM: ACUTE PAIN OF LEFT KNEE  RESULT: MRN: [id number]     Patient Name: SHAKILA ESQUEDA  STUDY:KNEE LT MIN 4 VIEW; 2/21/2023 8:50 am  INDICATION:ACUTE PAIN OF LEFT KNEE. Status post fall 2 weeks ago. Anterior pain and swelling.  COMPARISON:None.     ACCESSION NUMBER(S):[id number]  ORDERING CLINICIAN:YELENA MENENDEZ     TECHNIQUE:Left knee four views     FINDINGS:  No fractures or destructive lesions are identified. There is medial tibiofemoral marginal spurring. No effusion is identified.     IMPRESSION:  Medial tibiofemoral osteoarthritis.     Dictation workstation: [id number]  Original Interpreting Physician: BINDU TORRES M.D.  Original Transcribed by/Date: MMODAL Feb 21 2023 8:35A         Assessment   1. Pars defect  Referral to Pain Management    Referral to General Surgery     Referral to General Surgery    Chronic bilateral pars defects at L3 with associated      2. Anterolisthesis  Referral to Pain Management    Referral to General Surgery    Referral to General Surgery    Grade 1 anterolisthesis of L3 on L4. This can be confirmed on CT lumbar spine  if clinically warranted. No surrounding reactive marrow edema seen.      3. DDD (degenerative disc disease), lumbosacral  Referral to Pain Management    Referral to General Surgery    Referral to General Surgery    Degenerative disc disease and spondylosis L2-S1 most severe at L3-L4.      4. Other hemochromatosis  Referral to Pain Management    Referral to General Surgery    Referral to General Surgery    compatible with liver hemochromatosis.  Correlate with iron studies. If there is further concern, MRI liver  iron quantification may be obtained.      5. Protrusion of lumbar intervertebral disc  Referral to Pain Management    Referral to General Surgery    Referral to General Surgery    L2-L3 disc protrusion, L4-L5 disc protrustion      6. Bulging lumbar disc  Referral to Pain Management    Referral to General Surgery    Referral to General Surgery    L3-L4 disc bulge, L4-L5 disc bulge, L5-S1 disc bulge      7. Epidural lipomatosis  Referral to Pain Management    Referral to General Surgery    Referral to General Surgery    L3-L4 epidural lipomatsis causing sever spinal cord stenosis and mild BILATERAL neural foraminal stenosis and nerve impingement of L3-L4      8. Facet hypertrophy  Referral to Pain Management    Referral to General Surgery    Referral to General Surgery    Facet hypertrophy multiple levels gilles L5-S1          Treatment or Intervention:  Continue to alternate ice and moist heat as needed  ,  Finish remaining Physical Therapy visits with manual therapy as well as dry needling and IASTM   and lumbar traction  Continue to perform home exercises routinely    Once again, stressed the importance of wearing shoes with good  stability control to help with the biomechanics affecting the knees as well as the lower extremities   Once again, stressed the importance of wearing full foot insoles to help with the biomechanics affecting the knees as well as the lower extremities,  Once again, stressed the importance of wearing full foot insoles to help with the biomechanics affecting the knees as well as the lower extremities   Once again, recommendation over-the-counter vitamin-D 2 -3000+ milligrams a day, as well as a daily multivitamin.   Once again, recommendation over-the-counter curcumin, turmeric, boswellia, as well as egg shell membrane as directed to aid with joint inflammation.   Once again, recommendation over-the-counter Move Free for joint health.    Once again, the patient may alternate OTC Advil Liquid with OTC Tylenol Extra Strength or OTC Tylenol Arthritis, taking one every 6-8 hours with food as needed.    Discussed purchasing a pill  and taking supplements and medication with applesauce, Jell-O, pudding or other as patient states she can't swallow pills.    Patient advised regarding the risks and/or potential adverse reactions and/or side effects of any prescribed medications along with any over-the-counter medications or any supplements used. Patient advised to seek immediate medical care if any adverse reactions occur. The patient and/or patient(s) parent(s) verbalized their understanding,   Discussed in detail with the patient to the level of their understanding the possibility in the future of regenerative injections versus corticosteroid injections versus viscosupplementation injections versus a combination   Patient was offered to purchase a 6 mm heel lift to be placed in LEFT shoe to accommodate for leg length discrepancy found on standing erect pelvis xray. However, she did not wish to purchase one at this time.   Reviewed LUMBAR spine MRI in detail with the patient and/or patients parent/legal guardian to  their level of understanding; a copy of these results were provided to the patient and/or patients parent/legal guardian at the time of this office visit.  Referral sent over to Dr. Holley for pain management with the liklihood of epidural injections  Referral sent over to Dr. Suh neurosurgeon for spinal surgical intervention. However, patient states that she is unable to get downtown. Office recommended ubering, busses, and [employer]  Follow up as needed      Follow-up  as needed. I, Lalita Valadez, attest this documentation has been prepared under the direction and in the presence of Palomo Goldsmith D.O. By signing below, I, Rubin Goldsmith D.O, personally performed the services described in this documentation. All medical record entries made by the scribe were at my direction and in my presence. I have reviewed the chart and agree that the record reflects my personal performance and is accurate and complete. Please note that this report has been partially produced using speech recognition software. It may contain errors related to grammar, punctuation or spelling. Electronically signed, but not reviewed. Rubin Goldsmith D.O. Director of Sports Medicine.     RUBIN GOLDSMITH on 1/4/24 at 5:43 PM.     Rubin Goldsmith DO, [place]

## 2023-12-28 ENCOUNTER — TREATMENT (OUTPATIENT)
Dept: PHYSICAL THERAPY | Facility: CLINIC | Age: 68
End: 2023-12-28
Payer: COMMERCIAL

## 2023-12-28 DIAGNOSIS — S83.412A SPRAIN OF MEDIAL COLLATERAL LIGAMENT OF LEFT KNEE, INITIAL ENCOUNTER: ICD-10-CM

## 2023-12-28 DIAGNOSIS — M76.52 PATELLAR TENDINITIS, LEFT KNEE: ICD-10-CM

## 2023-12-28 DIAGNOSIS — S83.412D SPRAIN OF MEDIAL COLLATERAL LIGAMENT OF LEFT KNEE, SUBSEQUENT ENCOUNTER: ICD-10-CM

## 2023-12-28 DIAGNOSIS — M25.562 PAIN IN LEFT KNEE: ICD-10-CM

## 2023-12-28 PROCEDURE — 97113 AQUATIC THERAPY/EXERCISES: CPT | Mod: GP

## 2023-12-28 ASSESSMENT — PAIN SCALES - GENERAL: PAINLEVEL_OUTOF10: 0 - NO PAIN

## 2023-12-28 ASSESSMENT — PAIN - FUNCTIONAL ASSESSMENT: PAIN_FUNCTIONAL_ASSESSMENT: 0-10

## 2023-12-28 NOTE — PROGRESS NOTES
"Physical Therapy Treatment    Patient Name: Marietta Avilez  MRN: [id number]  Encounter date:  12/28/2023  Time Calculation  Start Time: 1500  Stop Time: 1545  Time Calculation (min): 45 min     PT Therapeutic Procedures Time Entry  Aquatic Therapy Time Entry: 45    Visit Number:  18 (including evaluation)  Planned total visits: 20  Visit Authorized:  40/yr (18 used prior to this series)     Current Problem  1. Sprain of medial collateral ligament of left knee, initial encounter  Follow Up In Physical Therapy      2. Sprain of medial collateral ligament of left knee, subsequent encounter  Follow Up In Physical Therapy      3. Patellar tendinitis, left knee  Follow Up In Physical Therapy      4. Pain in left knee  Follow Up In Physical Therapy        Precautions  Precautions  Precautions Comment: none    Pain  Pain Assessment: 0-10  Pain Score: 0 - No pain    Subjective  Pt has no pain today, her first pain-free day in a while. Pain has been fair since last visit. Follow up next week with Dr. Faulkner.    Objective  Intermittent stepping strategy with marching unsupported, no pain    Treatment:  Aquatic Therapy:   Aquatic Exercise Performed: Yes  Walk x3 laps fwd, bwd, s/s across pool    4' depth, cues for TrA brace, x20 ea with single UE on railing  Heel raise  Hip abduction  Hip extension  Hip flexion/SLR  Hamstring curl    Hamstring stretch at stairs 2x30\" ea  March in place x10 unsupported    Deep, 5' with noodle under arms, BUE support:  Decompression x2'  Hip abd/adduction x2'  XC ski x2'   Bike x2'  Decompression x2'    Assessment:  Pt's response to treatment: Pt with improving balance and stability through LLE evident with marching. Pt also without flexion pain in L knee. Pt with good decrease in pain over past week.     Pain end of session:  0/10    Plan:  Continue with current POC/no changes    Assessment of current progress against goals:  Progressing toward functional goals    Goals:  Active       PT Problem  "      Goals (Progressing)       Start:  09/28/23    Expected End:  01/25/24       Pt will demonstrate 0-125 deg L knee ROM without pain for improved body mechanics during functional mobility.     Pt will increase strength in LLE by 1/2 MMT in all planes for improved performance of functional mobility.    Pt will ambulate long community distances across all surfaces including grass, inclines, and declines without pain or deviation.    Pt will ascend/descend 2 flights of stairs reciprocally without pain for improved performance of household and community mobility.    Pt will stand for up to one hour without pain and without feelings of imbalance.    Pt will complete 5x sit to  12s without pain for improved functional strength and mobility.

## 2024-01-02 PROBLEM — E83.118 OTHER HEMOCHROMATOSIS: Status: ACTIVE | Noted: 2024-01-02

## 2024-01-02 PROBLEM — M51.379 DDD (DEGENERATIVE DISC DISEASE), LUMBOSACRAL: Status: ACTIVE | Noted: 2024-01-02

## 2024-01-02 PROBLEM — M43.10 ANTEROLISTHESIS: Status: ACTIVE | Noted: 2024-01-02

## 2024-01-02 PROBLEM — M51.36 BULGING LUMBAR DISC: Status: ACTIVE | Noted: 2023-12-06

## 2024-01-02 PROBLEM — M43.00 PARS DEFECT: Status: ACTIVE | Noted: 2024-01-02

## 2024-01-02 PROBLEM — M47.819 FACET HYPERTROPHY: Status: ACTIVE | Noted: 2024-01-02

## 2024-01-02 PROBLEM — M51.26 PROTRUSION OF LUMBAR INTERVERTEBRAL DISC: Status: ACTIVE | Noted: 2024-01-02

## 2024-01-02 PROBLEM — D17.79 EPIDURAL LIPOMATOSIS: Status: ACTIVE | Noted: 2024-01-02

## 2024-01-02 PROBLEM — M51.37 DDD (DEGENERATIVE DISC DISEASE), LUMBOSACRAL: Status: ACTIVE | Noted: 2024-01-02

## 2024-01-02 PROBLEM — M51.369 BULGING LUMBAR DISC: Status: ACTIVE | Noted: 2023-12-06

## 2024-01-04 ENCOUNTER — OFFICE VISIT (OUTPATIENT)
Dept: SPORTS MEDICINE | Facility: CLINIC | Age: 69
End: 2024-01-04
Payer: MEDICARE

## 2024-01-04 VITALS
DIASTOLIC BLOOD PRESSURE: 74 MMHG | HEIGHT: 64 IN | BODY MASS INDEX: 30.39 KG/M2 | HEART RATE: 78 BPM | SYSTOLIC BLOOD PRESSURE: 148 MMHG | WEIGHT: 178 LBS

## 2024-01-04 DIAGNOSIS — M43.00 PARS DEFECT: Primary | ICD-10-CM

## 2024-01-04 DIAGNOSIS — M51.36 BULGING LUMBAR DISC: ICD-10-CM

## 2024-01-04 DIAGNOSIS — E83.118 OTHER HEMOCHROMATOSIS: ICD-10-CM

## 2024-01-04 DIAGNOSIS — D17.79 EPIDURAL LIPOMATOSIS: ICD-10-CM

## 2024-01-04 DIAGNOSIS — M51.37 DDD (DEGENERATIVE DISC DISEASE), LUMBOSACRAL: ICD-10-CM

## 2024-01-04 DIAGNOSIS — M51.26 PROTRUSION OF LUMBAR INTERVERTEBRAL DISC: ICD-10-CM

## 2024-01-04 DIAGNOSIS — M43.10 ANTEROLISTHESIS: ICD-10-CM

## 2024-01-04 DIAGNOSIS — M47.819 FACET HYPERTROPHY: ICD-10-CM

## 2024-01-04 PROCEDURE — 1036F TOBACCO NON-USER: CPT | Performed by: FAMILY MEDICINE

## 2024-01-04 PROCEDURE — 99215 OFFICE O/P EST HI 40 MIN: CPT | Performed by: FAMILY MEDICINE

## 2024-01-04 PROCEDURE — 1125F AMNT PAIN NOTED PAIN PRSNT: CPT | Performed by: FAMILY MEDICINE

## 2024-01-04 PROCEDURE — 1159F MED LIST DOCD IN RCRD: CPT | Performed by: FAMILY MEDICINE

## 2024-01-04 PROCEDURE — 1160F RVW MEDS BY RX/DR IN RCRD: CPT | Performed by: FAMILY MEDICINE

## 2024-01-04 ASSESSMENT — LIFESTYLE VARIABLES
HAS A RELATIVE, FRIEND, DOCTOR, OR ANOTHER HEALTH PROFESSIONAL EXPRESSED CONCERN ABOUT YOUR DRINKING OR SUGGESTED YOU CUT DOWN: NO
AUDIT-C TOTAL SCORE: 0
HOW MANY STANDARD DRINKS CONTAINING ALCOHOL DO YOU HAVE ON A TYPICAL DAY: PATIENT DOES NOT DRINK
HOW OFTEN DO YOU HAVE SIX OR MORE DRINKS ON ONE OCCASION: NEVER
AUDIT TOTAL SCORE: 0
HAVE YOU OR SOMEONE ELSE BEEN INJURED AS A RESULT OF YOUR DRINKING: NO
SKIP TO QUESTIONS 9-10: 1
HOW OFTEN DO YOU HAVE A DRINK CONTAINING ALCOHOL: NEVER

## 2024-01-04 ASSESSMENT — PAIN - FUNCTIONAL ASSESSMENT: PAIN_FUNCTIONAL_ASSESSMENT: 0-10

## 2024-01-04 ASSESSMENT — PAIN SCALES - GENERAL: PAINLEVEL_OUTOF10: 2

## 2024-01-04 ASSESSMENT — ENCOUNTER SYMPTOMS
OCCASIONAL FEELINGS OF UNSTEADINESS: 1
LOSS OF SENSATION IN FEET: 1
DEPRESSION: 1

## 2024-01-04 ASSESSMENT — PAIN DESCRIPTION - DESCRIPTORS: DESCRIPTORS: ACHING

## 2024-01-04 NOTE — PATIENT INSTRUCTIONS
Treatment or Intervention:  Continue to alternate ice and moist heat as needed  ,  Finish remaining Physical Therapy visits with manual therapy as well as dry needling and IASTM    Continue to perform home exercises routinely    Once again, stressed the importance of wearing shoes with good stability control to help with the biomechanics affecting the knees as well as the lower extremities   Once again, stressed the importance of wearing full foot insoles to help with the biomechanics affecting the knees as well as the lower extremities,  Once again, stressed the importance of wearing full foot insoles to help with the biomechanics affecting the knees as well as the lower extremities   Once again, recommendation over-the-counter calcium with vitamin-D 2 -3000+ milligrams a day, as well as OTC symphytum as directed daily to promote bony healing, in addition to a daily multivitamin.   Once again, recommendation over-the-counter curcumin, turmeric, boswellia, as well as egg shell membrane as directed to aid with joint inflammation.   Once again, recommendation over-the-counter Move Free for joint health.    Once again, the patient may alternate OTC Advil Liquid with OTC Tylenol Extra Strength or OTC Tylenol Arthritis, taking one every 6-8 hours with food as needed.    Discussed purchasing a pill  and taking supplements and medication with applesauce as patient states she can't swallow pills.    Patient advised regarding the risks and/or potential adverse reactions and/or side effects of any prescribed medications along with any over-the-counter medications or any supplements used. Patient advised to seek immediate medical care if any adverse reactions occur. The patient and/or patient(s) parent(s) verbalized their understanding,   Discussed in detail with the patient to the level of their understanding the possibility in the future of regenerative injections versus corticosteroid injections versus  viscosupplementation injections versus a combination   Patient offered appropriate 6 millimeter heel lift to be placed in the LEFT shoe to accommodate for leg length discrepancy found on standing erect pelvis xray  Reviewed LUMBAR spine MRI in detail with the patient and/or patients parent/legal guardian to their level of understanding; a copy of these results were provided to the patient and/or patients parent/legal guardian at the time of this office visit.  Follow up with PCP and Endocrinologist for iron studies regarding liver  Follow up as needed

## 2024-01-12 ENCOUNTER — APPOINTMENT (OUTPATIENT)
Dept: PHYSICAL THERAPY | Facility: CLINIC | Age: 69
End: 2024-01-12
Payer: COMMERCIAL

## 2024-01-18 ENCOUNTER — APPOINTMENT (OUTPATIENT)
Dept: PHYSICAL THERAPY | Facility: CLINIC | Age: 69
End: 2024-01-18
Payer: COMMERCIAL

## 2024-02-02 ENCOUNTER — APPOINTMENT (OUTPATIENT)
Dept: PHYSICAL THERAPY | Facility: CLINIC | Age: 69
End: 2024-02-02
Payer: COMMERCIAL

## 2024-02-06 ENCOUNTER — OFFICE VISIT (OUTPATIENT)
Dept: PAIN MEDICINE | Facility: CLINIC | Age: 69
End: 2024-02-06
Payer: COMMERCIAL

## 2024-02-06 VITALS
HEIGHT: 64 IN | HEART RATE: 89 BPM | SYSTOLIC BLOOD PRESSURE: 128 MMHG | DIASTOLIC BLOOD PRESSURE: 70 MMHG | WEIGHT: 178 LBS | RESPIRATION RATE: 16 BRPM | BODY MASS INDEX: 30.39 KG/M2

## 2024-02-06 DIAGNOSIS — M54.16 LUMBAR RADICULOPATHY: ICD-10-CM

## 2024-02-06 DIAGNOSIS — M48.062 SPINAL STENOSIS, LUMBAR REGION, WITH NEUROGENIC CLAUDICATION: Primary | ICD-10-CM

## 2024-02-06 DIAGNOSIS — G89.29 OTHER CHRONIC PAIN: ICD-10-CM

## 2024-02-06 PROCEDURE — 1036F TOBACCO NON-USER: CPT | Performed by: ANESTHESIOLOGY

## 2024-02-06 PROCEDURE — 1160F RVW MEDS BY RX/DR IN RCRD: CPT | Performed by: ANESTHESIOLOGY

## 2024-02-06 PROCEDURE — 99214 OFFICE O/P EST MOD 30 MIN: CPT | Performed by: ANESTHESIOLOGY

## 2024-02-06 PROCEDURE — 1125F AMNT PAIN NOTED PAIN PRSNT: CPT | Performed by: ANESTHESIOLOGY

## 2024-02-06 PROCEDURE — 1159F MED LIST DOCD IN RCRD: CPT | Performed by: ANESTHESIOLOGY

## 2024-02-06 PROCEDURE — 99204 OFFICE O/P NEW MOD 45 MIN: CPT | Performed by: ANESTHESIOLOGY

## 2024-02-06 ASSESSMENT — PAIN - FUNCTIONAL ASSESSMENT: PAIN_FUNCTIONAL_ASSESSMENT: 0-10

## 2024-02-06 ASSESSMENT — ENCOUNTER SYMPTOMS
HEMATOLOGIC/LYMPHATIC NEGATIVE: 1
PSYCHIATRIC NEGATIVE: 1
BACK PAIN: 1
DEPRESSION: 0
GASTROINTESTINAL NEGATIVE: 1
LOSS OF SENSATION IN FEET: 0
ENDOCRINE NEGATIVE: 1
CONSTITUTIONAL NEGATIVE: 1
RESPIRATORY NEGATIVE: 1
OCCASIONAL FEELINGS OF UNSTEADINESS: 1
EYES NEGATIVE: 1
CARDIOVASCULAR NEGATIVE: 1
ALLERGIC/IMMUNOLOGIC NEGATIVE: 1
NEUROLOGICAL NEGATIVE: 1

## 2024-02-06 ASSESSMENT — LIFESTYLE VARIABLES: TOTAL SCORE: 0

## 2024-02-06 ASSESSMENT — PATIENT HEALTH QUESTIONNAIRE - PHQ9
10. IF YOU CHECKED OFF ANY PROBLEMS, HOW DIFFICULT HAVE THESE PROBLEMS MADE IT FOR YOU TO DO YOUR WORK, TAKE CARE OF THINGS AT HOME, OR GET ALONG WITH OTHER PEOPLE: NOT DIFFICULT AT ALL
2. FEELING DOWN, DEPRESSED OR HOPELESS: SEVERAL DAYS
SUM OF ALL RESPONSES TO PHQ9 QUESTIONS 1 AND 2: 1
1. LITTLE INTEREST OR PLEASURE IN DOING THINGS: NOT AT ALL

## 2024-02-06 ASSESSMENT — PAIN DESCRIPTION - DESCRIPTORS: DESCRIPTORS: ACHING;NUMBNESS

## 2024-02-06 ASSESSMENT — PAIN SCALES - GENERAL: PAINLEVEL_OUTOF10: 3

## 2024-02-06 NOTE — Clinical Note
Sent her over to you.  She was waiting to see Demi at [facility], said would be better.

## 2024-02-06 NOTE — PATIENT INSTRUCTIONS
What to Expect When you are having a procedure    Pending your health insurance provider, an authorization may be required before your procedure can be scheduled.    On average procedure authorization can take from 3-10 business days to obtain, however, some insurances may take up to 30 days if an appeal needs to be written or if your provider needs to talk with the insurance company to explain why the procedure needs to be performed.  Procedures are completed in one of 3 locations.   [facility]  [address], [place] [address]  [facility]  [address], [place] [address]  [facility]  [address] [place], [place] [address]  Our office staff will give you the date of your procedure and the procedure time will be provided by the location you are having your procedure at.    The schedulers will call you typically 1-2 days before your procedure to give you an arrival time and procedure time.   Procedures are typically completed with local anesthetic, to numb the skin, and take anywhere from 2-10 min to complete.    If needed light Oral or IV sedation is available, we are not able to put patients asleep for their procedure.   The hospital facilities do REQUIRE YOU TO HAVE A , however exceptions can be made for certain procedure.    We are placing numbing medication by nerves, which can cause temporary numbing to muscles in rare cases.  If you arrive with no , your procedure may be cancelled.    For most procedures, plan to be at the facility for about 1 -2 hr.    Your bedside nurse will provide post-procedure instructions to you at discharge.

## 2024-02-06 NOTE — PROGRESS NOTES
Subjective   Patient ID: Marietta Avilez is a 68 y.o. female who presents for Back Pain.  Back Pain    Patient here today for new patient evaluation of her low back pain.  She reports her pain started years ago.  She had surgery the first time in 1996.  She reports in the last year she has become more disabled.  She has been working with Dr. Faulkner and had a MRI completed.  She is currently in aqua therapy.  She has been attending this for almost 1 year at this time.  She was initially on land but the pool has been great for her.  The patient has an appointment with Dr. Suh in June at the [facility].  She does understand that she more than likely will need to have surgery.  She was referred here to help with her pain into the meantime.  She has severe back pain that radiates down to the anterior of her right thigh to the level of the knee.  She gets this with laying flat or with standing and walking.  Her standing and walking ability is limited to around 5 minutes at this time.  She would like some help in the meantime.  She has tried physical therapy as well as over-the-counter medications.  These were not long-term helpful for her.    Review of Systems   Constitutional: Negative.    HENT: Negative.     Eyes: Negative.    Respiratory: Negative.     Cardiovascular: Negative.    Gastrointestinal: Negative.    Endocrine: Negative.    Genitourinary: Negative.    Musculoskeletal:  Positive for back pain.   Skin: Negative.    Allergic/Immunologic: Negative.    Neurological: Negative.    Hematological: Negative.    Psychiatric/Behavioral: Negative.         Objective   Physical Exam  Vitals and nursing note reviewed.   Constitutional:       Appearance: Normal appearance.   HENT:      Head: Normocephalic and atraumatic.      Right Ear: Ear canal and external ear normal.      Left Ear: Ear canal and external ear normal.      Nose: Nose normal.      Mouth/Throat:      Mouth: Mucous membranes are moist.       Pharynx: Oropharynx is clear.   Eyes:      Conjunctiva/sclera: Conjunctivae normal.      Pupils: Pupils are equal, round, and reactive to light.   Cardiovascular:      Rate and Rhythm: Normal rate.   Pulmonary:      Effort: Pulmonary effort is normal. No respiratory distress.   Musculoskeletal:      Cervical back: Normal range of motion and neck supple.      Lumbar back: Tenderness present. Normal range of motion.   Skin:     General: Skin is warm and dry.   Neurological:      Mental Status: She is alert.   Psychiatric:         Mood and Affect: Mood normal.         Thought Content: Thought content normal.         Assessment/Plan   Problem List Items Addressed This Visit    None  Visit Diagnoses         Codes    Spinal stenosis, lumbar region, with neurogenic claudication    -  Primary M48.062    Lumbar radiculopathy     M54.16    Other chronic pain     G89.29          I nice discussion with the patient today our plan will be as follows.    Radiology:   Narrative & Impression   Interpreted By:  Jose Anne,   STUDY:  MR LUMBAR SPINE WO IV CONTRAST; 12/20/2023 3:51 pm      INDICATION:  Signs/Symptoms:Low back pain with radiculopathy, discitis, DDD,  spondylosis, Possibility of herniated or bulging disc vs ligamentous  injury vs stenosis vs fracture. [facility] radiologist to read please..      COMPARISON:  X-ray lumbar spine 12/06/2023      ACCESSION NUMBER(S):  [id number]      ORDERING CLINICIAN:  RUBIN GOLDSMITH      TECHNIQUE:  Multiple, multiplanar sequences of the lumbar spine were obtained.      FINDINGS:  The normal lumbar lordosis is preserved. The conus terminates at  L1-L2. No acute compression deformity is seen. However, there is a  suggestion of chronic bilateral pars defects noted at L3. There is  grade 1 anterolisthesis of L3 on L4. Degenerative endplate changes  are seen at L4-L5. No significant STIR abnormalities are seen within  the marrow.      The vertebral bodies are grossly preserved.      L1-L2: No  disc herniation. No significant canal or foraminal stenosis.  L2-L3: Broad-based disc protrusion, ligamentum flavum hypertrophy and  facet arthropathy and epidural lipomatosis causing mild canal  stenosis and mild bilateral foraminal stenosis. L3-L4: Diffuse disc  bulge with superimposed central disc extrusion, ligamentum flavum  hypertrophy and facet arthropathy and epidural lipomatosis causing  severe canal stenosis and mild bilateral foraminal stenosis.  Mass-effect on the nerve roots is suggested. L4-L5: Diffuse disc  bulge superimposed tiny central disc protrusion with evidence of  annular fissure causing no significant canal stenosis but moderate  bilateral foraminal stenosis. L5-S1: Diffuse disc bulge and  ligamentum flavum hypertrophy facet arthropathy causing moderate  bilateral foraminal stenosis but no significant canal stenosis.      Incidental note is made of hypointense T2 signal seen diffusely  within the liver.      IMPRESSION:  1. Chronic bilateral pars defects at L3 with associated grade 1  anterolisthesis of L3 on L4. This can be confirmed on CT lumbar spine  if clinically warranted. No surrounding reactive marrow edema seen.  2. Degenerative disc disease and spondylosis most severe at L3-L4.  Mass-effect upon the nerve roots at this level is suggested.  3. MR findings which may be compatible with liver hemochromatosis.  Correlate with iron studies. If there is further concern, MRI liver  iron quantification may be obtained.       Physically:  Patient has done extensive.      Psychologically: No issues at this time.    Medication: No changes at this time.    Duration: 1 year.    Intervention: Patient is an excellent candidate for right L3, L4 transforaminal epidural steroid injection under fluoroscopic guidance.  Risks, benefit, and alternatives of the procedure were discussed with the patient.  Oswestry score has been compelted and recorded.    I did express to the patient that waiting till June  is a long time and then to schedule surgery she is going to spend most of the year try to get in these appointments with Dr. Suh at the [facility].  I did refer her to Dr. Mcdaniel of neurosurgery at [facility] for another opinion.         Bishnu Holley MD 02/06/24 2:58 PM

## 2024-02-09 ENCOUNTER — APPOINTMENT (OUTPATIENT)
Dept: PHYSICAL THERAPY | Facility: CLINIC | Age: 69
End: 2024-02-09
Payer: COMMERCIAL

## 2024-02-16 ENCOUNTER — APPOINTMENT (OUTPATIENT)
Dept: PHYSICAL THERAPY | Facility: CLINIC | Age: 69
End: 2024-02-16
Payer: COMMERCIAL

## 2024-03-01 ENCOUNTER — APPOINTMENT (OUTPATIENT)
Dept: PHYSICAL THERAPY | Facility: CLINIC | Age: 69
End: 2024-03-01
Payer: MEDICARE

## 2024-03-01 ENCOUNTER — APPOINTMENT (OUTPATIENT)
Dept: OPERATING ROOM | Facility: HOSPITAL | Age: 69
End: 2024-03-01
Payer: COMMERCIAL

## 2024-03-08 ENCOUNTER — TREATMENT (OUTPATIENT)
Dept: PHYSICAL THERAPY | Facility: CLINIC | Age: 69
End: 2024-03-08
Payer: MEDICARE

## 2024-03-08 DIAGNOSIS — M25.562 PAIN IN LEFT KNEE: ICD-10-CM

## 2024-03-08 DIAGNOSIS — S83.412A SPRAIN OF MEDIAL COLLATERAL LIGAMENT OF LEFT KNEE, INITIAL ENCOUNTER: ICD-10-CM

## 2024-03-08 DIAGNOSIS — S83.412D SPRAIN OF MEDIAL COLLATERAL LIGAMENT OF LEFT KNEE, SUBSEQUENT ENCOUNTER: ICD-10-CM

## 2024-03-08 DIAGNOSIS — M76.52 PATELLAR TENDINITIS, LEFT KNEE: ICD-10-CM

## 2024-03-08 PROCEDURE — 97113 AQUATIC THERAPY/EXERCISES: CPT | Mod: GP

## 2024-03-08 ASSESSMENT — PAIN - FUNCTIONAL ASSESSMENT: PAIN_FUNCTIONAL_ASSESSMENT: 0-10

## 2024-03-08 ASSESSMENT — PAIN SCALES - GENERAL: PAINLEVEL_OUTOF10: 1

## 2024-03-08 NOTE — PROGRESS NOTES
"Physical Therapy Treatment    Patient Name: Marietta Avilez  MRN: [id number]  Encounter date:  3/8/2024  Time Calculation  Start Time: 1310  Stop Time: 1355  Time Calculation (min): 45 min     PT Therapeutic Procedures Time Entry  Aquatic Therapy Time Entry: 45    Visit Number:  19 (including evaluation)  Planned total visits: 20  Visit Authorized:  40/yr      Current Problem  1. Sprain of medial collateral ligament of left knee, initial encounter  Follow Up In Physical Therapy      2. Sprain of medial collateral ligament of left knee, subsequent encounter  Follow Up In Physical Therapy      3. Patellar tendinitis, left knee  Follow Up In Physical Therapy      4. Pain in left knee  Follow Up In Physical Therapy          Precautions  Precautions  Precautions Comment: none    Pain  Pain Assessment: 0-10  Pain Score: 1     Subjective  Pt not to therapy since 12/28 due to personal conflicts and other appointments. In that time she has seen Sports Medicine and Pain Management, and was referred to neurosurgery. Functionally, symptoms are about the same since last session. Pain is typically low level but she can sometimes get sharp pain in knee.     Objective  No gait deviations on pool deck    Treatment:  Aquatic Therapy:   Aquatic Exercise Performed: Yes  Walk x3 laps fwd, bwd, s/s across pool    4' depth, cues for TrA brace, x20 ea with single UE on railing  Heel raise  Hip abduction  Hip extension  Hip flexion/SLR  Hamstring curl  Mini squat x10    Hamstring stretch at stairs 2x30\" ea  March in place x10 unsupported    Deep, 5' with noodle under arms, BUE support:  Decompression x2'  Hip abd/adduction x2'  XC ski x2'   Bike x2'  Decompression x2'    Assessment:  Pt's response to treatment: Pt not to therapy for a while, pain has not significantly changed in that time. Despite time since last session due to schedule conflicts and other appointments, this POC remains appropriate as outlined previously. Pt has today's " treatment and one more visit scheduled on POC, plan to discharge following. Today, pt with good recall of exercises and good pain relief both in pool and following.     Pain end of session:  0/10    Plan:  Reassess next visit    Assessment of current progress against goals:  Progressing toward functional goals    Goals:  Active       PT Problem       Goals (Progressing)       Start:  09/28/23    Expected End:  04/07/24       Pt will demonstrate 0-125 deg L knee ROM without pain for improved body mechanics during functional mobility.     Pt will increase strength in LLE by 1/2 MMT in all planes for improved performance of functional mobility.    Pt will ambulate long community distances across all surfaces including grass, inclines, and declines without pain or deviation.    Pt will ascend/descend 2 flights of stairs reciprocally without pain for improved performance of household and community mobility.    Pt will stand for up to one hour without pain and without feelings of imbalance.    Pt will complete 5x sit to  12s without pain for improved functional strength and mobility.

## 2024-03-12 ENCOUNTER — APPOINTMENT (OUTPATIENT)
Dept: PAIN MEDICINE | Facility: CLINIC | Age: 69
End: 2024-03-12
Payer: COMMERCIAL

## 2024-03-15 ENCOUNTER — TREATMENT (OUTPATIENT)
Dept: PHYSICAL THERAPY | Facility: CLINIC | Age: 69
End: 2024-03-15
Payer: MEDICARE

## 2024-03-15 DIAGNOSIS — S83.412A SPRAIN OF MEDIAL COLLATERAL LIGAMENT OF LEFT KNEE, INITIAL ENCOUNTER: ICD-10-CM

## 2024-03-15 DIAGNOSIS — M76.52 PATELLAR TENDINITIS, LEFT KNEE: ICD-10-CM

## 2024-03-15 DIAGNOSIS — S83.412D SPRAIN OF MEDIAL COLLATERAL LIGAMENT OF LEFT KNEE, SUBSEQUENT ENCOUNTER: ICD-10-CM

## 2024-03-15 DIAGNOSIS — M25.562 PAIN IN LEFT KNEE: ICD-10-CM

## 2024-03-15 PROCEDURE — 97113 AQUATIC THERAPY/EXERCISES: CPT | Mod: GP

## 2024-03-15 ASSESSMENT — PAIN - FUNCTIONAL ASSESSMENT: PAIN_FUNCTIONAL_ASSESSMENT: 0-10

## 2024-03-15 ASSESSMENT — PAIN SCALES - GENERAL: PAINLEVEL_OUTOF10: 0 - NO PAIN

## 2024-03-15 NOTE — PROGRESS NOTES
"Physical Therapy Treatment and Discharge Report    Patient Name: Marietta Avilez  MRN: [id number]  Encounter date:  3/15/2024  Time Calculation  Start Time: 1311  Stop Time: 1356  Time Calculation (min): 45 min     PT Therapeutic Procedures Time Entry  Aquatic Therapy Time Entry: 45    Visit Number:  20 (including evaluation)  Planned total visits: 20  Visit Authorized:  40/yr      Current Problem  1. Sprain of medial collateral ligament of left knee, initial encounter  Follow Up In Physical Therapy      2. Sprain of medial collateral ligament of left knee, subsequent encounter  Follow Up In Physical Therapy      3. Patellar tendinitis, left knee  Follow Up In Physical Therapy      4. Pain in left knee  Follow Up In Physical Therapy            Precautions  Precautions  Precautions Comment: none    Pain  Pain Assessment: 0-10  Pain Score: 0 - No pain     Subjective  Pt feels fewer instances of sharp pain in knee since starting aquatics. Low level pain is still present however pt is ambulating, performing stairs, and completing ADLs without significant limitation.     Objective  LE MMT L   Hip flexion 4+/5   Hip extension 4+/5   Hip abduction 4+/5   Hip adduction 4+/5   Knee flexion 4+/5   Knee extension 4+/5     Knee AROM L   Flexion 118 deg   Extension 0 deg     Gait: No deviations within clinic, can have pain with distance  5x sit to stand: 13.5s  Stairs: performs reciprocally, no pain    Treatment:  Aquatic Therapy:   Aquatic Exercise Performed: Yes  Walk x3 laps fwd, bwd, s/s across pool    4' depth, cues for TrA brace, x20 ea with single UE on railing  Heel raise  Hip abduction  Hip extension  Hip flexion/SLR  Hamstring curl  Mini squat x10    Hamstring stretch at stairs 2x30\" ea  March across pool x3 laps    Deep, 5' with noodle under arms, BUE support:  Decompression x2'  Hip abd/adduction x2'  XC ski x2'   Bike x2'  Decompression x2'    Assessment:  Pt's response to treatment: She has completed 20 PT sessions to " address L knee pain. Pt has responded well to aquatics with reduced overall pain levels and fewer instances of more severe symptoms. Strength, ROM, and mobility patterns have also improved. At this time pt has maximized progress with therapy intervention and would likely benefit from independent aquatic exercise. Discussed with pt joining a fitness center to continue, pt in agreement with plan. Pt to be discharged following visit.    Pain end of session:  0/10    Plan:  Discharge    Goals:  Resolved       PT Problem       Goals (Adequate for Discharge)       Start:  09/28/23    Expected End:  04/07/24    Resolved:  03/15/24    Pt will demonstrate 0-125 deg L knee ROM without pain for improved body mechanics during functional mobility.     Pt will increase strength in LLE by 1/2 MMT in all planes for improved performance of functional mobility.    Pt will ambulate long community distances across all surfaces including grass, inclines, and declines without pain or deviation.    Pt will ascend/descend 2 flights of stairs reciprocally without pain for improved performance of household and community mobility.    Pt will stand for up to one hour without pain and without feelings of imbalance.    Pt will complete 5x sit to  12s without pain for improved functional strength and mobility.

## 2024-04-05 ENCOUNTER — HOSPITAL ENCOUNTER (OUTPATIENT)
Dept: RADIOLOGY | Facility: HOSPITAL | Age: 69
Discharge: HOME | End: 2024-04-05
Payer: COMMERCIAL

## 2024-04-05 ENCOUNTER — HOSPITAL ENCOUNTER (OUTPATIENT)
Dept: OPERATING ROOM | Facility: HOSPITAL | Age: 69
Discharge: HOME | End: 2024-04-05
Payer: COMMERCIAL

## 2024-04-05 VITALS
SYSTOLIC BLOOD PRESSURE: 141 MMHG | RESPIRATION RATE: 18 BRPM | DIASTOLIC BLOOD PRESSURE: 72 MMHG | BODY MASS INDEX: 29.37 KG/M2 | TEMPERATURE: 97 F | HEIGHT: 64 IN | OXYGEN SATURATION: 97 % | HEART RATE: 74 BPM | WEIGHT: 172 LBS

## 2024-04-05 DIAGNOSIS — M54.16 LUMBAR RADICULOPATHY: ICD-10-CM

## 2024-04-05 DIAGNOSIS — M48.062 SPINAL STENOSIS, LUMBAR REGION, WITH NEUROGENIC CLAUDICATION: ICD-10-CM

## 2024-04-05 PROCEDURE — 64484 NJX AA&/STRD TFRM EPI L/S EA: CPT | Performed by: ANESTHESIOLOGY

## 2024-04-05 PROCEDURE — 2500000004 HC RX 250 GENERAL PHARMACY W/ HCPCS (ALT 636 FOR OP/ED): Performed by: ANESTHESIOLOGY

## 2024-04-05 PROCEDURE — 64483 NJX AA&/STRD TFRM EPI L/S 1: CPT | Performed by: ANESTHESIOLOGY

## 2024-04-05 PROCEDURE — 2500000005 HC RX 250 GENERAL PHARMACY W/O HCPCS: Performed by: ANESTHESIOLOGY

## 2024-04-05 PROCEDURE — 2550000001 HC RX 255 CONTRASTS: Performed by: ANESTHESIOLOGY

## 2024-04-05 RX ORDER — LIDOCAINE HYDROCHLORIDE 20 MG/ML
INJECTION, SOLUTION INFILTRATION; PERINEURAL AS NEEDED
Status: COMPLETED | OUTPATIENT
Start: 2024-04-05 | End: 2024-04-05

## 2024-04-05 RX ORDER — DEXAMETHASONE SODIUM PHOSPHATE 100 MG/10ML
INJECTION INTRAMUSCULAR; INTRAVENOUS AS NEEDED
Status: COMPLETED | OUTPATIENT
Start: 2024-04-05 | End: 2024-04-05

## 2024-04-05 RX ORDER — LIDOCAINE HYDROCHLORIDE 5 MG/ML
INJECTION, SOLUTION INFILTRATION; PERINEURAL AS NEEDED
Status: COMPLETED | OUTPATIENT
Start: 2024-04-05 | End: 2024-04-05

## 2024-04-05 RX ADMIN — LIDOCAINE HYDROCHLORIDE 3 ML: 5 INJECTION, SOLUTION INFILTRATION; PERINEURAL at 12:41

## 2024-04-05 RX ADMIN — IOHEXOL 3 ML: 240 INJECTION, SOLUTION INTRATHECAL; INTRAVASCULAR; INTRAVENOUS; ORAL at 12:41

## 2024-04-05 RX ADMIN — DEXAMETHASONE SODIUM PHOSPHATE 10 MG: 10 INJECTION INTRAMUSCULAR; INTRAVENOUS at 12:41

## 2024-04-05 RX ADMIN — LIDOCAINE HYDROCHLORIDE 10 ML: 20 INJECTION, SOLUTION INFILTRATION; PERINEURAL at 12:40

## 2024-04-05 ASSESSMENT — ENCOUNTER SYMPTOMS
ALLERGIC/IMMUNOLOGIC NEGATIVE: 1
ENDOCRINE NEGATIVE: 1
CONSTITUTIONAL NEGATIVE: 1
EYES NEGATIVE: 1
HEMATOLOGIC/LYMPHATIC NEGATIVE: 1
CARDIOVASCULAR NEGATIVE: 1
NUMBNESS: 1
PSYCHIATRIC NEGATIVE: 1
BACK PAIN: 1
RESPIRATORY NEGATIVE: 1
GASTROINTESTINAL NEGATIVE: 1

## 2024-04-05 ASSESSMENT — PAIN SCALES - GENERAL
PAINLEVEL_OUTOF10: 5 - MODERATE PAIN
PAINLEVEL_OUTOF10: 2
PAINLEVEL_OUTOF10: 2

## 2024-04-05 ASSESSMENT — PAIN - FUNCTIONAL ASSESSMENT
PAIN_FUNCTIONAL_ASSESSMENT: 0-10
PAIN_FUNCTIONAL_ASSESSMENT: 0-10

## 2024-04-05 ASSESSMENT — COLUMBIA-SUICIDE SEVERITY RATING SCALE - C-SSRS
2. HAVE YOU ACTUALLY HAD ANY THOUGHTS OF KILLING YOURSELF?: NO
1. IN THE PAST MONTH, HAVE YOU WISHED YOU WERE DEAD OR WISHED YOU COULD GO TO SLEEP AND NOT WAKE UP?: NO
6. HAVE YOU EVER DONE ANYTHING, STARTED TO DO ANYTHING, OR PREPARED TO DO ANYTHING TO END YOUR LIFE?: NO

## 2024-04-05 ASSESSMENT — PAIN DESCRIPTION - DESCRIPTORS: DESCRIPTORS: DULL

## 2024-04-05 NOTE — OP NOTE
Preprocedure diagnosis: Lumbar radiculopathy  Postprocedure diagnosis lumbar radiculopathy    Procedure performed: Right L3, L4 transforaminal epidural steroid injection under fluoroscopic guidance    Physician: Bishnu Holley MD    Anesthesia: Local    Complications: none    Blood loss:  none    Clinical note: This is a very pleasant 68-year-old female who suffers with low back and right leg pain here meeting all medical criteria for above-mentioned procedure.    Procedure:      The patient was identified in the preoperative area.  The procedure was discussed in detail including its risks, benefits, and alternatives.  Signed consent was obtained and the patient agreed to proceed.       The patient was brought to the [facility] procedure room and positioned  in the prone position onto the procedure room table.  A pillow was placed below the abdomen to decrease lumbar lordosis and a safety strap was placed across the legs.  The lumbosacral region was then exposed, prepped, and draped in the usual sterile fashion using 2% Chloroprep scrub. Under fluoroscopic guidance in the AP, oblique, and lateral views, the right L3, L4 foramina were identified. The skin and subcutaneous tissue along the intended needle trajectories were anesthetized with 5 ml of 2% preservative free lidocaine.  Once adequate skin anesthesia was obtained, two 22-gauge Sprotte needles were advanced into the foramina. Once the desired needle tip location was achieved, the needles were aspirated and were negative for heme and CSF. Then 3mL of Omnipaque constrast dye was injected in divided doses under live fluoroscopy and showed appropriate epidural spread without intravascular or intrathecal uptake. After another negative aspiration, 10 mg of dexamethasone along with 3 ml of 0.5% preservative free lidocaine was injected in divided doses equally between the two needles. The needles were removed and bandages were applied.   The patient tolerated the procedure well and was transferred back to the discharge area.   The patient was monitored for 15 minutes and vital signs were stable. The patient was discharged home in stable condition with a .

## 2024-04-05 NOTE — H&P
"PAIN MANAGEMENT H&P    Date of Service: 4/5/2024  SUBJECTIVE    CHIEF COMPLAINT: Low back and right leg pain    HISTORY OF PRESENT ILLNESS    Marietta Avilez is a 68 y.o. old female with PMH lumbar radiculopathy who presents for lumbar epidural steroid injection    Pain and overall medical condition unchanged from previous visit. Pt is appropriately NPO. Pt denies new-onset numbness, weakness, bowel/bladder incontinence.  Pt denies recent infection/abx use, allergy to Latex/iodine/contrast. Patient is currently taking the following blood thinner(s):     REVIEW OF SYSTEMS  Review of Systems   Constitutional: Negative.    HENT: Negative.     Eyes: Negative.    Respiratory: Negative.     Cardiovascular: Negative.    Gastrointestinal: Negative.    Endocrine: Negative.    Genitourinary: Negative.    Musculoskeletal:  Positive for back pain and gait problem.   Skin: Negative.    Allergic/Immunologic: Negative.    Neurological:  Positive for numbness.   Hematological: Negative.    Psychiatric/Behavioral: Negative.         PAST MEDICAL HISTORY  Past Medical History:   Diagnosis Date    History of back surgery      Past Surgical History:   Procedure Laterality Date    BACK SURGERY       No family history on file.    CURRENT MEDICATIONS  No current outpatient medications on file.     No current facility-administered medications for this encounter.       ALLERGIES AND DRUG REACTIONS  No Known Allergies       OBJECTIVE  Visit Vitals  BP (!) 151/91   Pulse 86   Temp 36.1 °C (97 °F) (Temporal)   Resp 17   Ht 1.626 m (5' 4\")   Wt 78 kg (172 lb)   SpO2 98%   BMI 29.52 kg/m²   Smoking Status Never   BSA 1.88 m²     General: Lying comfortably in bed, NAD  Head: NCAT  Eyes: Sclera/conjunctiva clear, EOMI, PERRL  Nose/mouth: MMM  CV: Good distal pulses  Lungs: Good/equal chest excursion  Abdomen: Soft, ND  Ext: No cyanosis/edema  MSK: Able to move extremities  Neuro: AAOx3, grossly normal  Psych: affect nl      REVIEW OF LABORATORY " DATA  I have reviewed the following lab results:        REVIEW OF RADIOLOGY DATA  I have reviewed the following:  Radiology Studies           MRI lumbar    ASSESSMENT & PLAN  Marietta Avilez is a 68 y.o. old female with PMH low back and leg pain who presents for right L3, L4 transforaminal epidural steroid injection   -      Discussed procedure risks/benefits in detail with patient. Pt meets medical necessity for procedure due to failure of conservative measures. Reviewed procedural risks including bleeding, infection, nerve damage, paralysis. Also reviewed mitigating factors such as screening for infection/blood thinner use, sterile precautions, and image-guidance when applicable. All questions answered. Pt/guardian expressed understanding and choose to proceed            Bishnu Holley MD  Anesthesiologist & Interventional Pain Physician   [facility] [place]  O: [phone]  F: [phone]  12:29 PM  04/05/24

## 2024-04-05 NOTE — NURSING NOTE
1245 RECEIVED FROM OR. VSS. PAIN LEVEL 5/10. DRESSING INTACT TO RIGHT BUTTOCK AREA. AVS REVIEWED WITH PT. AND UNDERSTOOD.

## 2024-04-05 NOTE — DISCHARGE INSTRUCTIONS
DISCHARGE INSTRUCTIONS FOR INJECTIONS     You underwent lumbar epidural steroid injection today    Aftermost injections, it is recommended that you relax and limit your activity for the remainder of the day unless you have been told otherwise by your pain physician.  You should not drive a car, operate machinery, or make important legal decisions unless otherwise directed by your pain physician.  You may resume your normal activity, including exercise, tomorrow.      Keep a written pain diary of how much pain relief you experienced following the injection procedure and the length of time of pain relief you experienced pain relief. Following diagnostic injections like medial branch nerve blocks, sacroiliac joint blocks, stellate ganglion injections and other blocks, it is very important you record the specific amount of pain relief you experienced immediately after the injectionand how long it lasted. Your doctor will ask you for this information at your follow up visit.     For all injections, please keep the injection site dry and inspect the site for a couple of days. You may remove the Band-Aid the day of the injection at any time.     Some discomfort, bruising or slight swelling may occur at the injection site. This is not abnormal if it occurs.  If needed you may:    -Take over the counter medication such as Tylenol or Motrin.   -Apply an ice pack for 30 minutes, 2 to 3 times a day for the first 24 hours.     You may shower today; no soaking baths, hot tubs, whirlpools or swimming pools for two days.      If you are given steroids in your injection, it may take 3-5 days for the steroid medication to take effect. You may notice a worsening of your symptoms for 1-2 days after the injection. This is not abnormal.  You may use acetaminophen, ibuprofen, or prescription medication that your doctor may have prescribed for you if you need to do so.     A few common side effects of steroids include facial flushing,  sweating, restlessness, irritability,difficulty sleeping, increase in blood sugar, and increased blood pressure. If you have diabetes, please monitor your blood sugar at least once a day for at least 5 days. If you have poorly controlled high blood pressure, monitoryour blood pressure for at least 2 days and contact your primary care physician if these numbers are unusually high for you.      If you take aspirin or non-steroidal anti-inflammatory drugs (examples are Motrin, Advil, ibuprofen, Naprosyn, Voltaren, Relafen, etc.) you may restart these this evening, but stop taking it 3 days before your next appointment, unless instructed otherwiseby your physician.      You do not need to discontinue non-aspirin-containing pain medications prior to an injection (examples: Celebrex, tramadol, hydrocodone and acetaminophen).      If you take a blood thinning medication (Coumadin, Lovenox, Fragmin,Ticlid, Plavix, Pradaxa, etc.), please discuss this with your primary care physician/cardiologist and your pain physician. These medications MUST be discontinued before you can have an injection safely, without the risk of uncontrolled bleeding. If these medications are not discontinued for an appropriate period of time, you will not be able to receivean injection.      If you are taking Coumadin, please have your INR checked the morning of your procedure and bringthe result to your appointment unless otherwise instructed. If your INR is over 1.2, your injection may need to be rescheduled to avoid uncontrolled bleeding from the needle placement.     Call [facility] at [phone] between 8am-4pm Monday - Friday if you are experiencing the following:    If you received an epidural or spinal injection:    -Headache that doesnot go away with medicine, is worse when sitting or standing up, and is greatly relieved upon lying down.   -Severe pain worse than or different than your baseline pain.   -Chills or fever  (101º F or greater).   -Drainage or signs of infection at the injection site     Go directly to the Emergency Department if you are experiencing the following and received an epidural or spinal injection:   -Abrupt weakness or progressive weakness in your legs that starts after you leave the clinic.   -Abrupt severe or worsening numbness in your legs.   -Inability to urinate after the injection or loss of bowel or bladder control without the urge to defecate or urinate.     If you have a clinical question that cannot wait until your next appointment, please call [phone] between 8am-4pm Monday - Friday or send a [employer] message. We do our best to return all non-emergency messages within 24 hours, Monday - Friday. A nurse or physician will return your message.      If you need to cancel an appointment, please call the scheduling staff at [phone] during normal business hours or leave a message at least 24 hours in advance.     If you are going to be sedated for your next procedure, you MUST have responsible adult who can legally drive accompany you home. You cannot eat or drink for eight hours prior to the planned procedure if you are going to receive sedation. You may take your non-blood thinning medications with a small sip of water.

## 2024-04-25 ENCOUNTER — APPOINTMENT (OUTPATIENT)
Dept: PAIN MEDICINE | Facility: CLINIC | Age: 69
End: 2024-04-25
Payer: COMMERCIAL

## 2024-05-21 ENCOUNTER — APPOINTMENT (OUTPATIENT)
Dept: PAIN MEDICINE | Facility: CLINIC | Age: 69
End: 2024-05-21
Payer: COMMERCIAL

## 2024-08-27 ENCOUNTER — OFFICE VISIT (OUTPATIENT)
Dept: PAIN MEDICINE | Facility: CLINIC | Age: 69
End: 2024-08-27
Payer: COMMERCIAL

## 2024-08-27 VITALS
OXYGEN SATURATION: 97 % | WEIGHT: 172 LBS | HEART RATE: 76 BPM | DIASTOLIC BLOOD PRESSURE: 70 MMHG | BODY MASS INDEX: 29.37 KG/M2 | SYSTOLIC BLOOD PRESSURE: 130 MMHG | HEIGHT: 64 IN | RESPIRATION RATE: 16 BRPM

## 2024-08-27 DIAGNOSIS — M54.16 LUMBAR RADICULOPATHY: Primary | ICD-10-CM

## 2024-08-27 DIAGNOSIS — M47.816 LUMBAR SPONDYLOSIS: ICD-10-CM

## 2024-08-27 PROCEDURE — 99214 OFFICE O/P EST MOD 30 MIN: CPT | Performed by: ANESTHESIOLOGY

## 2024-08-27 PROCEDURE — 1160F RVW MEDS BY RX/DR IN RCRD: CPT | Performed by: ANESTHESIOLOGY

## 2024-08-27 PROCEDURE — 1159F MED LIST DOCD IN RCRD: CPT | Performed by: ANESTHESIOLOGY

## 2024-08-27 PROCEDURE — 1036F TOBACCO NON-USER: CPT | Performed by: ANESTHESIOLOGY

## 2024-08-27 PROCEDURE — 3008F BODY MASS INDEX DOCD: CPT | Performed by: ANESTHESIOLOGY

## 2024-08-27 PROCEDURE — 1125F AMNT PAIN NOTED PAIN PRSNT: CPT | Performed by: ANESTHESIOLOGY

## 2024-08-27 RX ORDER — SODIUM CHLORIDE, SODIUM LACTATE, POTASSIUM CHLORIDE, CALCIUM CHLORIDE 600; 310; 30; 20 MG/100ML; MG/100ML; MG/100ML; MG/100ML
20 INJECTION, SOLUTION INTRAVENOUS CONTINUOUS
OUTPATIENT
Start: 2024-08-27

## 2024-08-27 ASSESSMENT — ENCOUNTER SYMPTOMS
GASTROINTESTINAL NEGATIVE: 1
PSYCHIATRIC NEGATIVE: 1
NEUROLOGICAL NEGATIVE: 1
CONSTITUTIONAL NEGATIVE: 1
EYES NEGATIVE: 1
CARDIOVASCULAR NEGATIVE: 1
BACK PAIN: 1
HEMATOLOGIC/LYMPHATIC NEGATIVE: 1
ALLERGIC/IMMUNOLOGIC NEGATIVE: 1
RESPIRATORY NEGATIVE: 1
ENDOCRINE NEGATIVE: 1

## 2024-08-27 ASSESSMENT — PAIN SCALES - GENERAL
PAINLEVEL: 3
PAINLEVEL_OUTOF10: 3

## 2024-08-27 ASSESSMENT — PAIN DESCRIPTION - DESCRIPTORS: DESCRIPTORS: ACHING

## 2024-08-27 ASSESSMENT — PAIN - FUNCTIONAL ASSESSMENT: PAIN_FUNCTIONAL_ASSESSMENT: 0-10

## 2024-08-27 NOTE — PROGRESS NOTES
Subjective   Patient ID: Marietta Avilez is a 69 y.o. female who presents for Back Pain.  Back Pain      Patient here today for follow-up on her back and radiating leg pain.  She underwent right L3, L4 transforaminal epidural steroid injection earlier in the spring and she had excellent relief with it.  The relief did last her for over 8 weeks.  She had follow-up with her surgeon Dr. Suh at the [facility] who did not feel that she does have pathology at L3-4.  He had her get standing x-rays.  He wants her to have bilateral L3, L4 transforaminal epidural steroid injections and sent an order with her to the office visit today.  She continues to have pain with standing and walking.  It will radiate down into her hips and thighs.  She does get benefit with lumbar flexion.  She would like to get set up for the procedure her spine surgeon has ordered.  Review of Systems   Constitutional: Negative.    HENT: Negative.     Eyes: Negative.    Respiratory: Negative.     Cardiovascular: Negative.    Gastrointestinal: Negative.    Endocrine: Negative.    Genitourinary: Negative.    Musculoskeletal:  Positive for back pain.   Skin: Negative.    Allergic/Immunologic: Negative.    Neurological: Negative.    Hematological: Negative.    Psychiatric/Behavioral: Negative.         Objective   Physical Exam  Vitals and nursing note reviewed.   Constitutional:       Appearance: Normal appearance.   HENT:      Head: Normocephalic and atraumatic.      Right Ear: Ear canal and external ear normal.      Left Ear: Ear canal and external ear normal.      Nose: Nose normal.      Mouth/Throat:      Mouth: Mucous membranes are moist.      Pharynx: Oropharynx is clear.   Eyes:      Conjunctiva/sclera: Conjunctivae normal.      Pupils: Pupils are equal, round, and reactive to light.   Cardiovascular:      Rate and Rhythm: Normal rate.   Pulmonary:      Effort: Pulmonary effort is normal. No respiratory distress.   Musculoskeletal:       Cervical back: Normal range of motion and neck supple.      Lumbar back: Tenderness present. Normal range of motion.   Skin:     General: Skin is warm and dry.   Neurological:      Mental Status: She is alert.   Psychiatric:         Mood and Affect: Mood normal.         Thought Content: Thought content normal.         Assessment/Plan   Problem List Items Addressed This Visit             ICD-10-CM    Lumbar spondylosis M47.816    Relevant Orders    Transforaminal    [place] pain management     Other Visit Diagnoses         Codes    Lumbar radiculopathy    -  Primary M54.16    Relevant Orders    Transforaminal    FL pain management             I nice discussion with the patient today our plan will be as follows.    Radiology: All available imaging was reviewed today.    Physically: Patient to continue exercise program.    Psychologically: No behavioral health issues to address today.    Medication: No changes today.    Duration: Greater than 1 year.    Intervention: We will move forward with scheduling the patient for bilateral L3, L4 transforaminal epidural steroid injection under fluoroscopic guidance.  Risks, benefit, and alternatives of the procedure were discussed with the patient.  Oswestry score has been compelted and recorded.  Per Dr. Suh neurosurgery recommendation      Bishnu Holley MD 08/27/24 2:39 PM

## 2024-08-29 ENCOUNTER — TELEPHONE (OUTPATIENT)
Dept: PAIN MEDICINE | Facility: CLINIC | Age: 69
End: 2024-08-29
Payer: COMMERCIAL

## 2024-09-27 ENCOUNTER — HOSPITAL ENCOUNTER (OUTPATIENT)
Dept: GASTROENTEROLOGY | Facility: HOSPITAL | Age: 69
Discharge: HOME | End: 2024-09-27
Payer: MEDICARE

## 2024-09-27 VITALS
TEMPERATURE: 96.8 F | WEIGHT: 180 LBS | HEART RATE: 68 BPM | DIASTOLIC BLOOD PRESSURE: 83 MMHG | SYSTOLIC BLOOD PRESSURE: 170 MMHG | OXYGEN SATURATION: 100 % | BODY MASS INDEX: 28.93 KG/M2 | RESPIRATION RATE: 18 BRPM | HEIGHT: 66 IN

## 2024-09-27 DIAGNOSIS — M47.816 LUMBAR SPONDYLOSIS: ICD-10-CM

## 2024-09-27 DIAGNOSIS — M54.16 LUMBAR RADICULOPATHY: ICD-10-CM

## 2024-09-27 PROCEDURE — 2500000005 HC RX 250 GENERAL PHARMACY W/O HCPCS: Performed by: ANESTHESIOLOGY

## 2024-09-27 PROCEDURE — 2500000004 HC RX 250 GENERAL PHARMACY W/ HCPCS (ALT 636 FOR OP/ED): Performed by: ANESTHESIOLOGY

## 2024-09-27 PROCEDURE — 64483 NJX AA&/STRD TFRM EPI L/S 1: CPT | Mod: 50 | Performed by: ANESTHESIOLOGY

## 2024-09-27 PROCEDURE — 2550000001 HC RX 255 CONTRASTS: Performed by: ANESTHESIOLOGY

## 2024-09-27 RX ORDER — DEXAMETHASONE SODIUM PHOSPHATE 10 MG/ML
INJECTION INTRAMUSCULAR; INTRAVENOUS AS NEEDED
Status: COMPLETED | OUTPATIENT
Start: 2024-09-27 | End: 2024-09-27

## 2024-09-27 RX ORDER — LIDOCAINE HYDROCHLORIDE 5 MG/ML
INJECTION, SOLUTION INFILTRATION; INTRAVENOUS AS NEEDED
Status: COMPLETED | OUTPATIENT
Start: 2024-09-27 | End: 2024-09-27

## 2024-09-27 RX ORDER — LIDOCAINE HYDROCHLORIDE 20 MG/ML
INJECTION, SOLUTION EPIDURAL; INFILTRATION; INTRACAUDAL; PERINEURAL AS NEEDED
Status: COMPLETED | OUTPATIENT
Start: 2024-09-27 | End: 2024-09-27

## 2024-09-27 ASSESSMENT — ENCOUNTER SYMPTOMS
CHILLS: 0
HEADACHES: 0
WHEEZING: 0
COUGH: 0
COLOR CHANGE: 0
OCCASIONAL FEELINGS OF UNSTEADINESS: 1
UNEXPECTED WEIGHT CHANGE: 0
ABDOMINAL PAIN: 0
SPEECH DIFFICULTY: 0
SLEEP DISTURBANCE: 0
CONSTIPATION: 0
TROUBLE SWALLOWING: 0
DIARRHEA: 0
FEVER: 0
ABDOMINAL DISTENTION: 0
NAUSEA: 0
JOINT SWELLING: 0
LOSS OF SENSATION IN FEET: 1
DEPRESSION: 0
DIFFICULTY URINATING: 0
LIGHT-HEADEDNESS: 0
VOMITING: 0
DIZZINESS: 0
ARTHRALGIAS: 0
SHORTNESS OF BREATH: 0
CONFUSION: 0

## 2024-09-27 ASSESSMENT — PAIN SCALES - GENERAL
PAINLEVEL_OUTOF10: 0 - NO PAIN
PAINLEVEL_OUTOF10: 3

## 2024-09-27 ASSESSMENT — PAIN - FUNCTIONAL ASSESSMENT
PAIN_FUNCTIONAL_ASSESSMENT: 0-10
PAIN_FUNCTIONAL_ASSESSMENT: 0-10

## 2024-09-27 ASSESSMENT — COLUMBIA-SUICIDE SEVERITY RATING SCALE - C-SSRS
1. IN THE PAST MONTH, HAVE YOU WISHED YOU WERE DEAD OR WISHED YOU COULD GO TO SLEEP AND NOT WAKE UP?: NO
2. HAVE YOU ACTUALLY HAD ANY THOUGHTS OF KILLING YOURSELF?: NO
6. HAVE YOU EVER DONE ANYTHING, STARTED TO DO ANYTHING, OR PREPARED TO DO ANYTHING TO END YOUR LIFE?: NO

## 2024-09-27 ASSESSMENT — PAIN DESCRIPTION - DESCRIPTORS: DESCRIPTORS: ACHING

## 2024-09-27 NOTE — H&P
"PAIN MANAGEMENT H&P    Date of Service: 9/27/2024  SUBJECTIVE    CHIEF COMPLAINT: Leg pain    HISTORY OF PRESENT ILLNESS    Marietta Avilez is a 69 y.o. female with PMH low back and leg pain who presents for epidural steroid injection    Pain and overall medical condition unchanged from previous visit. Pt is appropriately NPO. Pt denies new-onset numbness, weakness, bowel/bladder incontinence.  Pt denies recent infection/abx use, allergy to Latex/iodine/contrast. Patient is currently taking the following blood thinner(s): None    REVIEW OF SYSTEMS  Review of Systems   Constitutional:  Negative for chills, fever and unexpected weight change.   HENT:  Negative for congestion and trouble swallowing.    Respiratory:  Negative for cough, shortness of breath and wheezing.    Cardiovascular:  Negative for chest pain.   Gastrointestinal:  Negative for abdominal distention, abdominal pain, constipation, diarrhea, nausea and vomiting.   Genitourinary:  Negative for difficulty urinating.   Musculoskeletal:  Negative for arthralgias and joint swelling.   Skin:  Negative for color change.   Neurological:  Negative for dizziness, speech difficulty, light-headedness and headaches.   Psychiatric/Behavioral:  Negative for confusion and sleep disturbance.      PAST MEDICAL HISTORY  Past Medical History:   Diagnosis Date   • History of back surgery      Past Surgical History:   Procedure Laterality Date   • BACK SURGERY       No family history on file.    CURRENT MEDICATIONS  No current outpatient medications on file.     No current facility-administered medications for this encounter.       ALLERGIES AND DRUG REACTIONS  No Known Allergies       OBJECTIVE  Visit Vitals  BP (!) 160/94   Pulse 81   Temp 36 °C (96.8 °F) (Temporal)   Resp 16   Ht 1.676 m (5' 6\")   Wt 81.6 kg (180 lb)   SpO2 98%   BMI 29.05 kg/m²   Smoking Status Never   BSA 1.95 m²     General: Lying comfortably in bed, NAD  Head: NCAT  Eyes: Sclera/conjunctiva clear, " "EOMI, PERRL  Nose/mouth: MMM  CV: Good distal pulses  Lungs: Good/equal chest excursion  Abdomen: Soft, ND  Ext: No cyanosis/edema  MSK: Able to move extremities  Neuro: AAOx3, grossly normal  Psych: affect nl      REVIEW OF LABORATORY DATA  I have reviewed the following lab results:  No results found for: \"WBC\", \"RBC\", \"HGB\", \"HCT\", \"MCV\", \"MCH\", \"MCHC\", \"RDW\", \"PLT\", \"MPV\"  No results found for: \"NA\", \"K\", \"CO2\", \"BUN\", \"CALCIUM\"  No results found for: \"PROTIME\", \"PTT\", \"INR\", \"FIBRINOGEN\"      REVIEW OF RADIOLOGY DATA  I have reviewed the following:  Radiology Studies           All imaging was reviewed    ASSESSMENT & PLAN  Marietta Avilez is a 69 y.o. female with PMH low back and leg pain who presents for bilateral L3 transforaminal epidural     Risks, benefit, and alternatives of the procedure were discussed with the patient.  Oswestry score has been compelted and recorded.      -ASA 3      Discussed procedure risks/benefits in detail with patient. Pt meets medical necessity for procedure due to failure of conservative measures. Reviewed procedural risks including bleeding, infection, nerve damage, paralysis. Also reviewed mitigating factors such as screening for infection/blood thinner use, sterile precautions, and image-guidance when applicable. All questions answered. Pt/guardian expressed understanding and choose to proceed            Bishnu Holley MD  Anesthesiologist & Interventional Pain Physician   [facility] [place]  O: [phone]  F: [phone]  1:46 PM  09/27/24    "

## 2024-09-27 NOTE — POST-PROCEDURE NOTE
Returns from procedure awake and conversing. Bed low & locked. Bandaids x 2 d/I. Visitor @ bedside.    Ambulates without difficulty.   Discharge instructions reviewed w/pt & spouse

## 2024-09-27 NOTE — DISCHARGE INSTRUCTIONS
DISCHARGE INSTRUCTIONS FOR INJECTIONS     You underwent lumbar epidural today    Aftermost injections, it is recommended that you relax and limit your activity for the remainder of the day unless you have been told otherwise by your pain physician.  You should not drive a car, operate machinery, or make important legal decisions unless otherwise directed by your pain physician.  You may resume your normal activity, including exercise, tomorrow.      Keep a written pain diary of how much pain relief you experienced following the injection procedure and the length of time of pain relief you experienced pain relief. Following diagnostic injections like medial branch nerve blocks, sacroiliac joint blocks, stellate ganglion injections and other blocks, it is very important you record the specific amount of pain relief you experienced immediately after the injectionand how long it lasted. Your doctor will ask you for this information at your follow up visit.     For all injections, please keep the injection site dry and inspect the site for a couple of days. You may remove the Band-Aid the day of the injection at any time.     Some discomfort, bruising or slight swelling may occur at the injection site. This is not abnormal if it occurs.  If needed you may:    -Take over the counter medication such as Tylenol or Motrin.   -Apply an ice pack for 30 minutes, 2 to 3 times a day for the first 24 hours.     You may shower today; no soaking baths, hot tubs, whirlpools or swimming pools for two days.      If you are given steroids in your injection, it may take 3-5 days for the steroid medication to take effect. You may notice a worsening of your symptoms for 1-2 days after the injection. This is not abnormal.  You may use acetaminophen, ibuprofen, or prescription medication that your doctor may have prescribed for you if you need to do so.     A few common side effects of steroids include facial flushing, sweating,  restlessness, irritability,difficulty sleeping, increase in blood sugar, and increased blood pressure. If you have diabetes, please monitor your blood sugar at least once a day for at least 5 days. If you have poorly controlled high blood pressure, monitoryour blood pressure for at least 2 days and contact your primary care physician if these numbers are unusually high for you.      If you take aspirin or non-steroidal anti-inflammatory drugs (examples are Motrin, Advil, ibuprofen, Naprosyn, Voltaren, Relafen, etc.) you may restart these this evening, but stop taking it 3 days before your next appointment, unless instructed otherwiseby your physician.      You do not need to discontinue non-aspirin-containing pain medications prior to an injection (examples: Celebrex, tramadol, hydrocodone and acetaminophen).      If you take a blood thinning medication (Coumadin, Lovenox, Fragmin,Ticlid, Plavix, Pradaxa, etc.), please discuss this with your primary care physician/cardiologist and your pain physician. These medications MUST be discontinued before you can have an injection safely, without the risk of uncontrolled bleeding. If these medications are not discontinued for an appropriate period of time, you will not be able to receivean injection.      If you are taking Coumadin, please have your INR checked the morning of your procedure and bringthe result to your appointment unless otherwise instructed. If your INR is over 1.2, your injection may need to be rescheduled to avoid uncontrolled bleeding from the needle placement.     Call [facility] at [phone] between 8am-4pm Monday - Friday if you are experiencing the following:    If you received an epidural or spinal injection:    -Headache that doesnot go away with medicine, is worse when sitting or standing up, and is greatly relieved upon lying down.   -Severe pain worse than or different than your baseline pain.   -Chills or fever (101º F or  greater).   -Drainage or signs of infection at the injection site     Go directly to the Emergency Department if you are experiencing the following and received an epidural or spinal injection:   -Abrupt weakness or progressive weakness in your legs that starts after you leave the clinic.   -Abrupt severe or worsening numbness in your legs.   -Inability to urinate after the injection or loss of bowel or bladder control without the urge to defecate or urinate.     If you have a clinical question that cannot wait until your next appointment, please call [phone] between 8am-4pm Monday - Friday or send a [employer] message. We do our best to return all non-emergency messages within 24 hours, Monday - Friday. A nurse or physician will return your message.      If you need to cancel an appointment, please call the scheduling staff at [phone] during normal business hours or leave a message at least 24 hours in advance.     If you are going to be sedated for your next procedure, you MUST have responsible adult who can legally drive accompany you home. You cannot eat or drink for eight hours prior to the planned procedure if you are going to receive sedation. You may take your non-blood thinning medications with a small sip of water.

## 2024-10-29 ENCOUNTER — APPOINTMENT (OUTPATIENT)
Dept: PAIN MEDICINE | Facility: CLINIC | Age: 69
End: 2024-10-29
Payer: MEDICARE

## 2024-12-03 ENCOUNTER — APPOINTMENT (OUTPATIENT)
Dept: PAIN MEDICINE | Facility: CLINIC | Age: 69
End: 2024-12-03
Payer: COMMERCIAL